# Patient Record
Sex: MALE | Race: WHITE | NOT HISPANIC OR LATINO | URBAN - METROPOLITAN AREA
[De-identification: names, ages, dates, MRNs, and addresses within clinical notes are randomized per-mention and may not be internally consistent; named-entity substitution may affect disease eponyms.]

---

## 2017-02-21 ENCOUNTER — EMERGENCY (EMERGENCY)
Facility: HOSPITAL | Age: 62
LOS: 1 days | Discharge: HOME | End: 2017-02-21
Attending: EMERGENCY MEDICINE | Admitting: EMERGENCY MEDICINE
Payer: COMMERCIAL

## 2017-02-21 VITALS
RESPIRATION RATE: 20 BRPM | OXYGEN SATURATION: 98 % | DIASTOLIC BLOOD PRESSURE: 75 MMHG | TEMPERATURE: 98 F | SYSTOLIC BLOOD PRESSURE: 148 MMHG | HEART RATE: 85 BPM

## 2017-02-21 DIAGNOSIS — K62.89 OTHER SPECIFIED DISEASES OF ANUS AND RECTUM: ICD-10-CM

## 2017-02-21 LAB
ALBUMIN SERPL ELPH-MCNC: 3.8 G/DL — SIGNIFICANT CHANGE UP (ref 3.3–5)
ALP SERPL-CCNC: 106 U/L — SIGNIFICANT CHANGE UP (ref 40–120)
ALT FLD-CCNC: 19 U/L RC — SIGNIFICANT CHANGE UP (ref 10–45)
ANION GAP SERPL CALC-SCNC: 15 MMOL/L — SIGNIFICANT CHANGE UP (ref 5–17)
AST SERPL-CCNC: 16 U/L — SIGNIFICANT CHANGE UP (ref 10–40)
BASOPHILS # BLD AUTO: 0 K/UL — SIGNIFICANT CHANGE UP (ref 0–0.2)
BILIRUB SERPL-MCNC: 0.5 MG/DL — SIGNIFICANT CHANGE UP (ref 0.2–1.2)
BUN SERPL-MCNC: 13 MG/DL — SIGNIFICANT CHANGE UP (ref 7–23)
CALCIUM SERPL-MCNC: 8.6 MG/DL — SIGNIFICANT CHANGE UP (ref 8.4–10.5)
CHLORIDE SERPL-SCNC: 101 MMOL/L — SIGNIFICANT CHANGE UP (ref 96–108)
CO2 SERPL-SCNC: 24 MMOL/L — SIGNIFICANT CHANGE UP (ref 22–31)
CREAT SERPL-MCNC: 0.74 MG/DL — SIGNIFICANT CHANGE UP (ref 0.5–1.3)
EOSINOPHIL # BLD AUTO: 0 K/UL — SIGNIFICANT CHANGE UP (ref 0–0.5)
GLUCOSE SERPL-MCNC: 224 MG/DL — HIGH (ref 70–99)
HCT VFR BLD CALC: 34 % — LOW (ref 39–50)
HGB BLD-MCNC: 10.9 G/DL — LOW (ref 13–17)
LYMPHOCYTES # BLD AUTO: 1.3 K/UL — SIGNIFICANT CHANGE UP (ref 1–3.3)
LYMPHOCYTES # BLD AUTO: 20 % — SIGNIFICANT CHANGE UP (ref 13–44)
MCHC RBC-ENTMCNC: 20.7 PG — LOW (ref 27–34)
MCHC RBC-ENTMCNC: 32.1 GM/DL — SIGNIFICANT CHANGE UP (ref 32–36)
MCV RBC AUTO: 64.5 FL — LOW (ref 80–100)
MONOCYTES # BLD AUTO: 0.5 K/UL — SIGNIFICANT CHANGE UP (ref 0–0.9)
MONOCYTES NFR BLD AUTO: 2 % — SIGNIFICANT CHANGE UP (ref 2–14)
NEUTROPHILS # BLD AUTO: 6.4 K/UL — SIGNIFICANT CHANGE UP (ref 1.8–7.4)
NEUTROPHILS NFR BLD AUTO: 77 % — SIGNIFICANT CHANGE UP (ref 43–77)
PLATELET # BLD AUTO: 326 K/UL — SIGNIFICANT CHANGE UP (ref 150–400)
POTASSIUM SERPL-MCNC: 4.2 MMOL/L — SIGNIFICANT CHANGE UP (ref 3.5–5.3)
POTASSIUM SERPL-SCNC: 4.2 MMOL/L — SIGNIFICANT CHANGE UP (ref 3.5–5.3)
PROT SERPL-MCNC: 7.7 G/DL — SIGNIFICANT CHANGE UP (ref 6–8.3)
RBC # BLD: 5.27 M/UL — SIGNIFICANT CHANGE UP (ref 4.2–5.8)
RBC # FLD: 14.2 % — SIGNIFICANT CHANGE UP (ref 10.3–14.5)
SODIUM SERPL-SCNC: 140 MMOL/L — SIGNIFICANT CHANGE UP (ref 135–145)
WBC # BLD: 8.2 K/UL — SIGNIFICANT CHANGE UP (ref 3.8–10.5)
WBC # FLD AUTO: 8.2 K/UL — SIGNIFICANT CHANGE UP (ref 3.8–10.5)

## 2017-02-21 PROCEDURE — 99284 EMERGENCY DEPT VISIT MOD MDM: CPT

## 2017-02-21 PROCEDURE — 71020: CPT | Mod: 26

## 2017-02-21 RX ORDER — SODIUM CHLORIDE 9 MG/ML
1000 INJECTION INTRAMUSCULAR; INTRAVENOUS; SUBCUTANEOUS ONCE
Qty: 0 | Refills: 0 | Status: COMPLETED | OUTPATIENT
Start: 2017-02-21 | End: 2017-02-21

## 2017-02-21 RX ADMIN — SODIUM CHLORIDE 1000 MILLILITER(S): 9 INJECTION INTRAMUSCULAR; INTRAVENOUS; SUBCUTANEOUS at 22:10

## 2017-02-21 NOTE — ED ADULT NURSE NOTE - OBJECTIVE STATEMENT
61 yr male presented to ED with family c/o rectal pain.  Pt reports difficulty having a bowel movement and urinating for 1 day with night sweats, Pt reports losing 10lbs over the last 3 weeks.  Pt reports having a bowel movement here and now feels better, Pt reports "I no longer have pain."  Pt denies fever/cough, no abdominal pain, bowel sounds present x4, abdomin soft non-distended, non tender to the touch, Pt reports stool was normal color, not red, no n/v, skin warm dry & intact, a&ox4.

## 2017-02-21 NOTE — ED PROVIDER NOTE - ATTENDING CONTRIBUTION TO CARE
62 yo male with PMH of HTN, HL, DM presents with 3 weeks worth of night sweats and weight loss with some chills. No f/n/v/diarrhea/abdominal pain/myalgias/arthralgias/rash/neuro sx. No GI bleeding. No hx of malignancy in himself. Father with prostate cancer- his PSA normal last year. Had some rectal pressure prior to coming here, alleviated with BM. Patient went to PMD with negative quantiferon for TB. On exam, AVS< NAD, cta bl, s1, s,2 rrr, soft nt  nd adbomn, no r/g/r, no le edema, no bruising, petechea, No LAD, including in groin. ambulatory.

## 2017-02-21 NOTE — ED PROVIDER NOTE - OBJECTIVE STATEMENT
60 yo male with PMHx of DM and HLD p/w night sweats.  The patient reports that he has had night sweats for the past 2-3 weeks.  He was seen by his PMD and had a negative cxr, negative quant gold for TB and was started on azithromycin without improvement.  He denies fevers/chills, cough, sob.  He also reports that he has been constipated for the past 2-3 days.  This was associated with rectal pressure.  In the ED he had a large bowel movement and his rectal pressure resolved. BM was normal, denies hematochezia and melena.  Patient also endorses weight loss ~10lbs over the past few weeks.  Patient never had a colonoscopy.

## 2017-02-21 NOTE — ED PROVIDER NOTE - CARE PLAN
Principal Discharge DX:	Constipation  Instructions for follow-up, activity and diet:	1.  Stay Hydrated  2.  Take Miralax daily as needed for constipation  3.  Follow up with your PMD to schedule outpatient colonoscopy   4.  Return to the ER for abdominal pain, lightheadedness, shortness of breath or any other concerning symptoms

## 2017-02-21 NOTE — ED PROVIDER NOTE - PLAN OF CARE
1.  Stay Hydrated  2.  Take Miralax daily as needed for constipation  3.  Follow up with your PMD to schedule outpatient colonoscopy   4.  Return to the ER for abdominal pain, lightheadedness, shortness of breath or any other concerning symptoms

## 2017-02-22 VITALS
SYSTOLIC BLOOD PRESSURE: 141 MMHG | OXYGEN SATURATION: 100 % | RESPIRATION RATE: 20 BRPM | HEART RATE: 80 BPM | DIASTOLIC BLOOD PRESSURE: 75 MMHG

## 2017-02-22 LAB
APPEARANCE UR: CLEAR — SIGNIFICANT CHANGE UP
BILIRUB UR-MCNC: NEGATIVE — SIGNIFICANT CHANGE UP
COLOR SPEC: YELLOW — SIGNIFICANT CHANGE UP
COMMENT - URINE: SIGNIFICANT CHANGE UP
DIFF PNL FLD: NEGATIVE — SIGNIFICANT CHANGE UP
EPI CELLS # UR: SIGNIFICANT CHANGE UP /HPF
GLUCOSE UR QL: 500
KETONES UR-MCNC: ABNORMAL
LEUKOCYTE ESTERASE UR-ACNC: NEGATIVE — SIGNIFICANT CHANGE UP
NITRITE UR-MCNC: NEGATIVE — SIGNIFICANT CHANGE UP
PH UR: 5.5 — SIGNIFICANT CHANGE UP (ref 4.8–8)
PROT UR-MCNC: SIGNIFICANT CHANGE UP
RBC CASTS # UR COMP ASSIST: SIGNIFICANT CHANGE UP /HPF (ref 0–2)
SP GR SPEC: 1.03 — HIGH (ref 1.01–1.02)
UROBILINOGEN FLD QL: NEGATIVE — SIGNIFICANT CHANGE UP
WBC UR QL: SIGNIFICANT CHANGE UP /HPF (ref 0–5)

## 2017-02-22 PROCEDURE — 82272 OCCULT BLD FECES 1-3 TESTS: CPT

## 2017-02-22 PROCEDURE — 85027 COMPLETE CBC AUTOMATED: CPT

## 2017-02-22 PROCEDURE — 71046 X-RAY EXAM CHEST 2 VIEWS: CPT

## 2017-02-22 PROCEDURE — 99284 EMERGENCY DEPT VISIT MOD MDM: CPT | Mod: 25

## 2017-02-22 PROCEDURE — 81001 URINALYSIS AUTO W/SCOPE: CPT

## 2017-02-22 PROCEDURE — 80053 COMPREHEN METABOLIC PANEL: CPT

## 2017-02-22 NOTE — ED ADULT NURSE REASSESSMENT NOTE - NS ED NURSE REASSESS COMMENT FT1
Pt d/c received written & verbal instructions, pt verbalized understanding, IV d/c site clear no redness or swelling, VSS, a&ox4.

## 2020-03-30 ENCOUNTER — INPATIENT (INPATIENT)
Facility: HOSPITAL | Age: 65
LOS: 14 days | Discharge: HOME HEALTH | DRG: 177 | End: 2020-04-14
Attending: PEDIATRICS | Admitting: STUDENT IN AN ORGANIZED HEALTH CARE EDUCATION/TRAINING PROGRAM
Payer: COMMERCIAL

## 2020-03-30 VITALS
DIASTOLIC BLOOD PRESSURE: 54 MMHG | TEMPERATURE: 100 F | RESPIRATION RATE: 22 BRPM | HEART RATE: 91 BPM | OXYGEN SATURATION: 89 % | WEIGHT: 154.32 LBS | SYSTOLIC BLOOD PRESSURE: 79 MMHG | HEIGHT: 68 IN

## 2020-03-30 LAB — GAS PNL BLDV: SIGNIFICANT CHANGE UP

## 2020-03-30 PROCEDURE — 93010 ELECTROCARDIOGRAM REPORT: CPT | Mod: NC

## 2020-03-30 PROCEDURE — 99285 EMERGENCY DEPT VISIT HI MDM: CPT

## 2020-03-30 PROCEDURE — 71045 X-RAY EXAM CHEST 1 VIEW: CPT | Mod: 26

## 2020-03-30 RX ORDER — SODIUM CHLORIDE 9 MG/ML
500 INJECTION INTRAMUSCULAR; INTRAVENOUS; SUBCUTANEOUS ONCE
Refills: 0 | Status: COMPLETED | OUTPATIENT
Start: 2020-03-30 | End: 2020-03-30

## 2020-03-30 RX ADMIN — SODIUM CHLORIDE 500 MILLILITER(S): 9 INJECTION INTRAMUSCULAR; INTRAVENOUS; SUBCUTANEOUS at 22:30

## 2020-03-30 NOTE — ED PROVIDER NOTE - CLINICAL SUMMARY MEDICAL DECISION MAKING FREE TEXT BOX
Adult male with progressive respiratory symptoms cough,fever,fatgiue body aches and hypoxia, cw covid19. Check xr, covid swab, labs, treat w ivf and oxgen. tba

## 2020-03-30 NOTE — ED PROVIDER NOTE - OBJECTIVE STATEMENT
Private Physician can't recall  64y male PMH DM,HLD, pw several day hx of cough,body aches, tactile fever, progressive shortness of breath,without spuutm,has mild pleuretic cp,no nvdc. abd pain. Private Physician can't recall  64y male PMH DM,HLD, pw several day hx of cough,body aches, tactile fever, progressive shortness of breath,without sputum ,has mild pleuritic cp,no nvdc. abd pain.

## 2020-03-30 NOTE — ED ADULT TRIAGE NOTE - CHIEF COMPLAINT QUOTE
fever, dry cough elev bs, nausea chest pain fever, dry cough elev bs, nausea chest pain  (dtrs ph: [phone])

## 2020-03-30 NOTE — ED ADULT NURSE NOTE - OBJECTIVE STATEMENT
63y/o M coming to the ED c/o of SOB/Fever. Pt came from triage hypotensive to 79/58, 89% RA, tachypneic and labored. On arrival, pt placed on 2L NC saturating at 95%, /75, and started on 500CC NS. Pt states that for the past 5-6 days, pt has been experiencing a dry cough and subjective fevers. Pt states that his SOB worsened today. Pt c/o of generalized body aches 9/10. Pt denies any N/V/D but c/o of sternal CP d/t constant cough. On exam, abdomen soft, non-tender, non-distended. Heart monitor placed, NSR. B/L 18 gauges placed. EKG completed. COVID 19 swab sent. Pt educated on airborne, contact, droplet precautions. Pt given call bell and educated on how to use.

## 2020-03-30 NOTE — ED ADULT NURSE NOTE - NSIMPLEMENTINTERV_GEN_ALL_ED
Implemented All Universal Safety Interventions:  [place] to call system. Call bell, personal items and telephone within reach. Instruct patient to call for assistance. Room bathroom lighting operational. Non-slip footwear when patient is off stretcher. Physically safe environment: no spills, clutter or unnecessary equipment. Stretcher in lowest position, wheels locked, appropriate side rails in place.

## 2020-03-31 DIAGNOSIS — J96.90 RESPIRATORY FAILURE, UNSPECIFIED, UNSPECIFIED WHETHER WITH HYPOXIA OR HYPERCAPNIA: ICD-10-CM

## 2020-03-31 DIAGNOSIS — Z29.9 ENCOUNTER FOR PROPHYLACTIC MEASURES, UNSPECIFIED: ICD-10-CM

## 2020-03-31 DIAGNOSIS — J96.01 ACUTE RESPIRATORY FAILURE WITH HYPOXIA: ICD-10-CM

## 2020-03-31 DIAGNOSIS — E11.9 TYPE 2 DIABETES MELLITUS WITHOUT COMPLICATIONS: ICD-10-CM

## 2020-03-31 PROBLEM — E78.00 PURE HYPERCHOLESTEROLEMIA, UNSPECIFIED: Chronic | Status: ACTIVE | Noted: 2017-02-21

## 2020-03-31 LAB
ALBUMIN SERPL ELPH-MCNC: 2.8 G/DL — LOW (ref 3.3–5)
ALBUMIN SERPL ELPH-MCNC: 3.1 G/DL — LOW (ref 3.3–5)
ALP SERPL-CCNC: 116 U/L — SIGNIFICANT CHANGE UP (ref 40–120)
ALP SERPL-CCNC: 130 U/L — HIGH (ref 40–120)
ALT FLD-CCNC: 29 U/L — SIGNIFICANT CHANGE UP (ref 10–45)
ALT FLD-CCNC: 41 U/L — SIGNIFICANT CHANGE UP (ref 10–45)
ANION GAP SERPL CALC-SCNC: 17 MMOL/L — SIGNIFICANT CHANGE UP (ref 5–17)
ANION GAP SERPL CALC-SCNC: 18 MMOL/L — HIGH (ref 5–17)
APTT BLD: 29.8 SEC — SIGNIFICANT CHANGE UP (ref 27.5–36.3)
AST SERPL-CCNC: 47 U/L — HIGH (ref 10–40)
AST SERPL-CCNC: 67 U/L — HIGH (ref 10–40)
BASOPHILS # BLD AUTO: 0 K/UL — SIGNIFICANT CHANGE UP (ref 0–0.2)
BASOPHILS # BLD AUTO: 0 K/UL — SIGNIFICANT CHANGE UP (ref 0–0.2)
BASOPHILS NFR BLD AUTO: 0 % — SIGNIFICANT CHANGE UP (ref 0–2)
BASOPHILS NFR BLD AUTO: 0 % — SIGNIFICANT CHANGE UP (ref 0–2)
BILIRUB SERPL-MCNC: 0.4 MG/DL — SIGNIFICANT CHANGE UP (ref 0.2–1.2)
BILIRUB SERPL-MCNC: 0.5 MG/DL — SIGNIFICANT CHANGE UP (ref 0.2–1.2)
BUN SERPL-MCNC: 10 MG/DL — SIGNIFICANT CHANGE UP (ref 7–23)
BUN SERPL-MCNC: 9 MG/DL — SIGNIFICANT CHANGE UP (ref 7–23)
CALCIUM SERPL-MCNC: 8.1 MG/DL — LOW (ref 8.4–10.5)
CALCIUM SERPL-MCNC: 8.4 MG/DL — SIGNIFICANT CHANGE UP (ref 8.4–10.5)
CHLORIDE SERPL-SCNC: 100 MMOL/L — SIGNIFICANT CHANGE UP (ref 96–108)
CHLORIDE SERPL-SCNC: 99 MMOL/L — SIGNIFICANT CHANGE UP (ref 96–108)
CK SERPL-CCNC: 197 U/L — SIGNIFICANT CHANGE UP (ref 30–200)
CO2 SERPL-SCNC: 21 MMOL/L — LOW (ref 22–31)
CO2 SERPL-SCNC: 22 MMOL/L — SIGNIFICANT CHANGE UP (ref 22–31)
CREAT SERPL-MCNC: 0.69 MG/DL — SIGNIFICANT CHANGE UP (ref 0.5–1.3)
CREAT SERPL-MCNC: 0.76 MG/DL — SIGNIFICANT CHANGE UP (ref 0.5–1.3)
CRP SERPL-MCNC: 24.9 MG/DL — HIGH (ref 0–0.4)
ELLIPTOCYTES BLD QL SMEAR: SLIGHT — SIGNIFICANT CHANGE UP
EOSINOPHIL # BLD AUTO: 0 K/UL — SIGNIFICANT CHANGE UP (ref 0–0.5)
EOSINOPHIL # BLD AUTO: 0 K/UL — SIGNIFICANT CHANGE UP (ref 0–0.5)
EOSINOPHIL NFR BLD AUTO: 0 % — SIGNIFICANT CHANGE UP (ref 0–6)
EOSINOPHIL NFR BLD AUTO: 0 % — SIGNIFICANT CHANGE UP (ref 0–6)
ERYTHROCYTE [SEDIMENTATION RATE] IN BLOOD: 46 MM/HR — HIGH (ref 0–20)
FERRITIN SERPL-MCNC: 883 NG/ML — HIGH (ref 30–400)
GLUCOSE BLDC GLUCOMTR-MCNC: 184 MG/DL — HIGH (ref 70–99)
GLUCOSE BLDC GLUCOMTR-MCNC: 204 MG/DL — HIGH (ref 70–99)
GLUCOSE BLDC GLUCOMTR-MCNC: 229 MG/DL — HIGH (ref 70–99)
GLUCOSE BLDC GLUCOMTR-MCNC: 235 MG/DL — HIGH (ref 70–99)
GLUCOSE SERPL-MCNC: 239 MG/DL — HIGH (ref 70–99)
GLUCOSE SERPL-MCNC: 266 MG/DL — HIGH (ref 70–99)
HCT VFR BLD CALC: 32.9 % — LOW (ref 39–50)
HCT VFR BLD CALC: 35.3 % — LOW (ref 39–50)
HGB BLD-MCNC: 10.7 G/DL — LOW (ref 13–17)
HGB BLD-MCNC: 11.5 G/DL — LOW (ref 13–17)
HYPOCHROMIA BLD QL: SLIGHT — SIGNIFICANT CHANGE UP
INR BLD: 1.03 RATIO — SIGNIFICANT CHANGE UP (ref 0.88–1.16)
LDH SERPL L TO P-CCNC: 507 U/L — HIGH (ref 50–242)
LYMPHOCYTES # BLD AUTO: 0.41 K/UL — LOW (ref 1–3.3)
LYMPHOCYTES # BLD AUTO: 0.89 K/UL — LOW (ref 1–3.3)
LYMPHOCYTES # BLD AUTO: 12 % — LOW (ref 13–44)
LYMPHOCYTES # BLD AUTO: 4.8 % — LOW (ref 13–44)
MAGNESIUM SERPL-MCNC: 2 MG/DL — SIGNIFICANT CHANGE UP (ref 1.6–2.6)
MANUAL SMEAR VERIFICATION: SIGNIFICANT CHANGE UP
MCHC RBC-ENTMCNC: 20.7 PG — LOW (ref 27–34)
MCHC RBC-ENTMCNC: 20.8 PG — LOW (ref 27–34)
MCHC RBC-ENTMCNC: 32.5 GM/DL — SIGNIFICANT CHANGE UP (ref 32–36)
MCHC RBC-ENTMCNC: 32.6 GM/DL — SIGNIFICANT CHANGE UP (ref 32–36)
MCV RBC AUTO: 63.7 FL — LOW (ref 80–100)
MCV RBC AUTO: 63.8 FL — LOW (ref 80–100)
MONOCYTES # BLD AUTO: 0.15 K/UL — SIGNIFICANT CHANGE UP (ref 0–0.9)
MONOCYTES # BLD AUTO: 0.16 K/UL — SIGNIFICANT CHANGE UP (ref 0–0.9)
MONOCYTES NFR BLD AUTO: 1.9 % — LOW (ref 2–14)
MONOCYTES NFR BLD AUTO: 2 % — SIGNIFICANT CHANGE UP (ref 2–14)
MYELOCYTES NFR BLD: 2 % — HIGH (ref 0–0)
NEUTROPHILS # BLD AUTO: 6.2 K/UL — SIGNIFICANT CHANGE UP (ref 1.8–7.4)
NEUTROPHILS # BLD AUTO: 7.5 K/UL — HIGH (ref 1.8–7.4)
NEUTROPHILS NFR BLD AUTO: 82 % — HIGH (ref 43–77)
NEUTROPHILS NFR BLD AUTO: 85.6 % — HIGH (ref 43–77)
NEUTS BAND # BLD: 2 % — SIGNIFICANT CHANGE UP (ref 0–8)
NRBC # BLD: 0 /100 — SIGNIFICANT CHANGE UP (ref 0–0)
PLAT MORPH BLD: NORMAL — SIGNIFICANT CHANGE UP
PLATELET # BLD AUTO: 264 K/UL — SIGNIFICANT CHANGE UP (ref 150–400)
PLATELET # BLD AUTO: 267 K/UL — SIGNIFICANT CHANGE UP (ref 150–400)
POIKILOCYTOSIS BLD QL AUTO: SLIGHT — SIGNIFICANT CHANGE UP
POTASSIUM SERPL-MCNC: 3.7 MMOL/L — SIGNIFICANT CHANGE UP (ref 3.5–5.3)
POTASSIUM SERPL-MCNC: 4.2 MMOL/L — SIGNIFICANT CHANGE UP (ref 3.5–5.3)
POTASSIUM SERPL-SCNC: 3.7 MMOL/L — SIGNIFICANT CHANGE UP (ref 3.5–5.3)
POTASSIUM SERPL-SCNC: 4.2 MMOL/L — SIGNIFICANT CHANGE UP (ref 3.5–5.3)
PROCALCITONIN SERPL-MCNC: 0.28 NG/ML — HIGH (ref 0.02–0.1)
PROT SERPL-MCNC: 6.5 G/DL — SIGNIFICANT CHANGE UP (ref 6–8.3)
PROT SERPL-MCNC: 7.2 G/DL — SIGNIFICANT CHANGE UP (ref 6–8.3)
PROTHROM AB SERPL-ACNC: 11.8 SEC — SIGNIFICANT CHANGE UP (ref 10–12.9)
RBC # BLD: 5.16 M/UL — SIGNIFICANT CHANGE UP (ref 4.2–5.8)
RBC # BLD: 5.54 M/UL — SIGNIFICANT CHANGE UP (ref 4.2–5.8)
RBC # FLD: 15.9 % — HIGH (ref 10.3–14.5)
RBC # FLD: 16.1 % — HIGH (ref 10.3–14.5)
RBC BLD AUTO: ABNORMAL
SODIUM SERPL-SCNC: 138 MMOL/L — SIGNIFICANT CHANGE UP (ref 135–145)
SODIUM SERPL-SCNC: 139 MMOL/L — SIGNIFICANT CHANGE UP (ref 135–145)
WBC # BLD: 7.38 K/UL — SIGNIFICANT CHANGE UP (ref 3.8–10.5)
WBC # BLD: 8.48 K/UL — SIGNIFICANT CHANGE UP (ref 3.8–10.5)
WBC # FLD AUTO: 7.38 K/UL — SIGNIFICANT CHANGE UP (ref 3.8–10.5)
WBC # FLD AUTO: 8.48 K/UL — SIGNIFICANT CHANGE UP (ref 3.8–10.5)

## 2020-03-31 PROCEDURE — 99223 1ST HOSP IP/OBS HIGH 75: CPT

## 2020-03-31 RX ORDER — GLUCAGON INJECTION, SOLUTION 0.5 MG/.1ML
1 INJECTION, SOLUTION SUBCUTANEOUS ONCE
Refills: 0 | Status: DISCONTINUED | OUTPATIENT
Start: 2020-03-31 | End: 2020-04-04

## 2020-03-31 RX ORDER — ACETAMINOPHEN 500 MG
650 TABLET ORAL EVERY 4 HOURS
Refills: 0 | Status: DISCONTINUED | OUTPATIENT
Start: 2020-03-31 | End: 2020-04-14

## 2020-03-31 RX ORDER — ENOXAPARIN SODIUM 100 MG/ML
40 INJECTION SUBCUTANEOUS DAILY
Refills: 0 | Status: DISCONTINUED | OUTPATIENT
Start: 2020-03-31 | End: 2020-04-08

## 2020-03-31 RX ORDER — ASCORBIC ACID 60 MG
1500 TABLET,CHEWABLE ORAL DAILY
Refills: 0 | Status: DISCONTINUED | OUTPATIENT
Start: 2020-03-31 | End: 2020-04-14

## 2020-03-31 RX ORDER — DEXTROSE 50 % IN WATER 50 %
25 SYRINGE (ML) INTRAVENOUS ONCE
Refills: 0 | Status: DISCONTINUED | OUTPATIENT
Start: 2020-03-31 | End: 2020-04-05

## 2020-03-31 RX ORDER — INSULIN LISPRO 100/ML
VIAL (ML) SUBCUTANEOUS
Refills: 0 | Status: DISCONTINUED | OUTPATIENT
Start: 2020-03-31 | End: 2020-04-05

## 2020-03-31 RX ORDER — DEXTROSE 50 % IN WATER 50 %
15 SYRINGE (ML) INTRAVENOUS ONCE
Refills: 0 | Status: DISCONTINUED | OUTPATIENT
Start: 2020-03-31 | End: 2020-04-05

## 2020-03-31 RX ORDER — DEXTROSE 50 % IN WATER 50 %
12.5 SYRINGE (ML) INTRAVENOUS ONCE
Refills: 0 | Status: DISCONTINUED | OUTPATIENT
Start: 2020-03-31 | End: 2020-04-05

## 2020-03-31 RX ORDER — ZINC SULFATE TAB 220 MG (50 MG ZINC EQUIVALENT) 220 (50 ZN) MG
220 TAB ORAL DAILY
Refills: 0 | Status: DISCONTINUED | OUTPATIENT
Start: 2020-03-31 | End: 2020-04-14

## 2020-03-31 RX ORDER — SODIUM CHLORIDE 9 MG/ML
1000 INJECTION, SOLUTION INTRAVENOUS
Refills: 0 | Status: DISCONTINUED | OUTPATIENT
Start: 2020-03-31 | End: 2020-04-05

## 2020-03-31 RX ORDER — INSULIN LISPRO 100/ML
VIAL (ML) SUBCUTANEOUS AT BEDTIME
Refills: 0 | Status: DISCONTINUED | OUTPATIENT
Start: 2020-03-31 | End: 2020-04-03

## 2020-03-31 RX ORDER — ACETAMINOPHEN 500 MG
650 TABLET ORAL EVERY 4 HOURS
Refills: 0 | Status: DISCONTINUED | OUTPATIENT
Start: 2020-03-31 | End: 2020-04-04

## 2020-03-31 RX ADMIN — ENOXAPARIN SODIUM 40 MILLIGRAM(S): 100 INJECTION SUBCUTANEOUS at 11:46

## 2020-03-31 RX ADMIN — Medication 1500 MILLIGRAM(S): at 15:26

## 2020-03-31 RX ADMIN — Medication 4: at 11:45

## 2020-03-31 RX ADMIN — ZINC SULFATE TAB 220 MG (50 MG ZINC EQUIVALENT) 220 MILLIGRAM(S): 220 (50 ZN) TAB at 15:26

## 2020-03-31 RX ADMIN — Medication 4: at 17:17

## 2020-03-31 NOTE — ED ADULT NURSE REASSESSMENT NOTE - NS ED NURSE REASSESS COMMENT FT1
Report received from Payam Cox in green. Pt observed sitting up in stretcher conversing with RN without difficulty. Pt maintained on 5L NC with oxygen saturation >95%. Pt is well appearing, speaking full sentences without difficulty with intermittent coughing. Pt repositioned for comfort and updated on plan of care awaiting bed assignment at this time. Safety and comfort measures initiated- bed placed in lowest position and side rails raised. Pt oriented to call bell system.

## 2020-03-31 NOTE — H&P ADULT - ASSESSMENT
63yo male with hx of DM II, presented to the ED with complaints of cough, myalgias, fever and progressively worsening sob for the past several days, noted to have acute respiratory failure with hypoxia likely due to viral Pneumonia rule out COVID

## 2020-03-31 NOTE — H&P ADULT - PROBLEM SELECTOR PLAN 2
-CXR with Bilateral patchy opacities concerning for COVID-19  -Likely due to exposure at work  -Maintain isolation  -Management as above regarding respiratory support  -Follow up with COVID testing. If positive, start Plaquenil and monitor EKG/Mg/K levels

## 2020-03-31 NOTE — H&P ADULT - PROBLEM SELECTOR PLAN 1
-Likely secondary to Viral PNA, Rule out COVID  -CXR with Bilateral patchy opacities  -Provide supplemental O2. Escalate to NRB if required   -Wean off O2 as tolerated  -Monitor vitals

## 2020-03-31 NOTE — H&P ADULT - HISTORY OF PRESENT ILLNESS
65yo male 63yo male with hx of DM II, presented to the ED with complaints of cough, myalgias, fever and progressively worsening sob for the past several days. Pt states his symptoms started approximately 4-5 days ago. He initially complained of a mild cough which worsened with sob. He started developing fever as well. 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 He states he stopped going to work 1 week ago, a few days before developing symptoms. No travel history either.  Pt denies symptoms of cp, palpitations, abd pain, N/V. He currently has complaints of sob with movement and diffuse body aches.     In the ED pt noted to have respiratory compromise, requiring supplemental O2 with NC.

## 2020-04-01 LAB
GLUCOSE BLDC GLUCOMTR-MCNC: 206 MG/DL — HIGH (ref 70–99)
GLUCOSE BLDC GLUCOMTR-MCNC: 211 MG/DL — HIGH (ref 70–99)
GLUCOSE BLDC GLUCOMTR-MCNC: 221 MG/DL — HIGH (ref 70–99)
GLUCOSE BLDC GLUCOMTR-MCNC: 249 MG/DL — HIGH (ref 70–99)
SARS-COV-2 RNA SPEC QL NAA+PROBE: DETECTED

## 2020-04-01 PROCEDURE — 99233 SBSQ HOSP IP/OBS HIGH 50: CPT | Mod: GC

## 2020-04-01 RX ORDER — HYDROXYCHLOROQUINE SULFATE 200 MG
400 TABLET ORAL EVERY 12 HOURS
Refills: 0 | Status: COMPLETED | OUTPATIENT
Start: 2020-04-01 | End: 2020-04-02

## 2020-04-01 RX ORDER — SODIUM CHLORIDE 9 MG/ML
500 INJECTION INTRAMUSCULAR; INTRAVENOUS; SUBCUTANEOUS ONCE
Refills: 0 | Status: COMPLETED | OUTPATIENT
Start: 2020-04-01 | End: 2020-04-01

## 2020-04-01 RX ORDER — HYDROXYCHLOROQUINE SULFATE 200 MG
TABLET ORAL
Refills: 0 | Status: DISCONTINUED | OUTPATIENT
Start: 2020-04-01 | End: 2020-04-03

## 2020-04-01 RX ORDER — HYDROXYCHLOROQUINE SULFATE 200 MG
200 TABLET ORAL EVERY 12 HOURS
Refills: 0 | Status: DISCONTINUED | OUTPATIENT
Start: 2020-04-02 | End: 2020-04-03

## 2020-04-01 RX ORDER — INFLUENZA VIRUS VACCINE 15; 15; 15; 15 UG/.5ML; UG/.5ML; UG/.5ML; UG/.5ML
0.5 SUSPENSION INTRAMUSCULAR ONCE
Refills: 0 | Status: DISCONTINUED | OUTPATIENT
Start: 2020-04-01 | End: 2020-04-14

## 2020-04-01 RX ORDER — GUAIFENESIN/DEXTROMETHORPHAN 600MG-30MG
10 TABLET, EXTENDED RELEASE 12 HR ORAL EVERY 6 HOURS
Refills: 0 | Status: DISCONTINUED | OUTPATIENT
Start: 2020-04-01 | End: 2020-04-14

## 2020-04-01 RX ADMIN — Medication 400 MILLIGRAM(S): at 13:40

## 2020-04-01 RX ADMIN — Medication 1500 MILLIGRAM(S): at 11:33

## 2020-04-01 RX ADMIN — Medication 10 MILLILITER(S): at 21:54

## 2020-04-01 RX ADMIN — Medication 4: at 18:14

## 2020-04-01 RX ADMIN — Medication 4: at 13:02

## 2020-04-01 RX ADMIN — Medication 4: at 09:26

## 2020-04-01 RX ADMIN — SODIUM CHLORIDE 1000 MILLILITER(S): 9 INJECTION INTRAMUSCULAR; INTRAVENOUS; SUBCUTANEOUS at 21:11

## 2020-04-01 RX ADMIN — ENOXAPARIN SODIUM 40 MILLIGRAM(S): 100 INJECTION SUBCUTANEOUS at 11:33

## 2020-04-01 RX ADMIN — ZINC SULFATE TAB 220 MG (50 MG ZINC EQUIVALENT) 220 MILLIGRAM(S): 220 (50 ZN) TAB at 11:33

## 2020-04-01 RX ADMIN — Medication 100 MILLIGRAM(S): at 10:24

## 2020-04-01 NOTE — PROGRESS NOTE ADULT - PROBLEM SELECTOR PLAN 1
- Likely secondary to Viral PNA, Rule out COVID  - CXR with Bilateral Patchy Opacities  - Provide supplemental O2, Escalate to NRB if required   - Wean off O2 as tolerated  - Monitor vitals

## 2020-04-01 NOTE — PROGRESS NOTE ADULT - SUBJECTIVE AND OBJECTIVE BOX
Patient is a 64y old  Male who presents with a chief complaint of SOB (31 Mar 2020 09:20)      SUBJECTIVE / OVERNIGHT EVENTS:     MEDICATIONS  (STANDING):  ascorbic acid 1500 milliGRAM(s) Oral daily  dextrose 5%. 1000 milliLiter(s) (50 mL/Hr) IV Continuous <Continuous>  dextrose 50% Injectable 12.5 Gram(s) IV Push once  dextrose 50% Injectable 25 Gram(s) IV Push once  dextrose 50% Injectable 25 Gram(s) IV Push once  enoxaparin Injectable 40 milliGRAM(s) SubCutaneous daily  influenza   Vaccine 0.5 milliLiter(s) IntraMuscular once  insulin lispro (HumaLOG) corrective regimen sliding scale   SubCutaneous three times a day before meals  insulin lispro (HumaLOG) corrective regimen sliding scale   SubCutaneous at bedtime  zinc sulfate 220 milliGRAM(s) Oral daily    MEDICATIONS  (PRN):  acetaminophen   Tablet .. 650 milliGRAM(s) Oral every 4 hours PRN Temp greater or equal to 38.5C (101.3F)  acetaminophen  Suppository .. 650 milliGRAM(s) Rectal every 4 hours PRN Temp greater or equal to 38.5C (101.3F)  dextrose 40% Gel 15 Gram(s) Oral once PRN Blood Glucose LESS THAN 70 milliGRAM(s)/deciliter  glucagon  Injectable 1 milliGRAM(s) IntraMuscular once PRN Glucose LESS THAN 70 milligrams/deciliter      Vital Signs Last 24 Hrs  T(C): 37.1 (01 Apr 2020 04:51), Max: 37.6 (31 Mar 2020 14:38)  T(F): 98.7 (01 Apr 2020 04:51), Max: 99.6 (31 Mar 2020 14:38)  HR: 77 (01 Apr 2020 04:51) (75 - 79)  BP: 127/78 (01 Apr 2020 04:51) (94/64 - 129/78)  BP(mean): --  RR: 18 (01 Apr 2020 04:51) (18 - 24)  SpO2: 95% (01 Apr 2020 04:51) (91% - 97%)  CAPILLARY BLOOD GLUCOSE      POCT Blood Glucose.: 184 mg/dL (31 Mar 2020 21:55)  POCT Blood Glucose.: 204 mg/dL (31 Mar 2020 17:13)  POCT Blood Glucose.: 235 mg/dL (31 Mar 2020 11:42)  POCT Blood Glucose.: 229 mg/dL (31 Mar 2020 10:45)    I&O's Summary      PHYSICAL EXAM:  GENERAL: NAD, well-developed  HEAD:  Atraumatic, Normocephalic  EYES: EOMI, PERRLA, conjunctiva and sclera clear  NECK: Supple, No JVD  CHEST/LUNG: Clear to auscultation bilaterally; No wheeze  HEART: Regular rate and rhythm; No murmurs, rubs, or gallops  ABDOMEN: Soft, Nontender, Nondistended; Bowel sounds present  EXTREMITIES:  2+ Peripheral Pulses, No clubbing, cyanosis, or edema  PSYCH: AAOx3  NEUROLOGY: non-focal  SKIN: No rashes or lesions    LABS:                        10.7   7.38  )-----------( 264      ( 31 Mar 2020 11:19 )             32.9     03-31    139  |  100  |  9   ----------------------------<  239<H>  3.7   |  21<L>  |  0.69    Ca    8.1<L>      31 Mar 2020 11:19  Mg     2.0     03-31    TPro  6.5  /  Alb  2.8<L>  /  TBili  0.5  /  DBili  x   /  AST  67<H>  /  ALT  41  /  AlkPhos  116  03-31    PT/INR - ( 30 Mar 2020 23:29 )   PT: 11.8 sec;   INR: 1.03 ratio         PTT - ( 30 Mar 2020 23:29 )  PTT:29.8 sec  CARDIAC MARKERS ( 30 Mar 2020 23:29 )  x     / x     / 197 U/L / x     / x                  RADIOLOGY & ADDITIONAL TESTS:    < from: Xray Chest 1 View-PORTABLE IMMEDIATE (03.30.20 @ 23:56) >  IMPRESSION:     Bilateral patchy opacities are compatible with multifocal pneumonia--viral pneumonia (including COVID19) is in the differential diagnosis.    < end of copied text > Patient is a 64y old  Male who presents with a chief complaint of SOB (31 Mar 2020 09:20)      SUBJECTIVE / OVERNIGHT EVENTS:   No acute complaints.    12 pt ros neg except for above.     MEDICATIONS  (STANDING):  ascorbic acid 1500 milliGRAM(s) Oral daily  dextrose 5%. 1000 milliLiter(s) (50 mL/Hr) IV Continuous <Continuous>  dextrose 50% Injectable 12.5 Gram(s) IV Push once  dextrose 50% Injectable 25 Gram(s) IV Push once  dextrose 50% Injectable 25 Gram(s) IV Push once  enoxaparin Injectable 40 milliGRAM(s) SubCutaneous daily  influenza   Vaccine 0.5 milliLiter(s) IntraMuscular once  insulin lispro (HumaLOG) corrective regimen sliding scale   SubCutaneous three times a day before meals  insulin lispro (HumaLOG) corrective regimen sliding scale   SubCutaneous at bedtime  zinc sulfate 220 milliGRAM(s) Oral daily    MEDICATIONS  (PRN):  acetaminophen   Tablet .. 650 milliGRAM(s) Oral every 4 hours PRN Temp greater or equal to 38.5C (101.3F)  acetaminophen  Suppository .. 650 milliGRAM(s) Rectal every 4 hours PRN Temp greater or equal to 38.5C (101.3F)  dextrose 40% Gel 15 Gram(s) Oral once PRN Blood Glucose LESS THAN 70 milliGRAM(s)/deciliter  glucagon  Injectable 1 milliGRAM(s) IntraMuscular once PRN Glucose LESS THAN 70 milligrams/deciliter      Vital Signs Last 24 Hrs  T(C): 37.1 (01 Apr 2020 04:51), Max: 37.6 (31 Mar 2020 14:38)  T(F): 98.7 (01 Apr 2020 04:51), Max: 99.6 (31 Mar 2020 14:38)  HR: 77 (01 Apr 2020 04:51) (75 - 79)  BP: 127/78 (01 Apr 2020 04:51) (94/64 - 129/78)  BP(mean): --  RR: 18 (01 Apr 2020 04:51) (18 - 24)  SpO2: 95% (01 Apr 2020 04:51) (91% - 97%)  CAPILLARY BLOOD GLUCOSE      POCT Blood Glucose.: 184 mg/dL (31 Mar 2020 21:55)  POCT Blood Glucose.: 204 mg/dL (31 Mar 2020 17:13)  POCT Blood Glucose.: 235 mg/dL (31 Mar 2020 11:42)  POCT Blood Glucose.: 229 mg/dL (31 Mar 2020 10:45)    I&O's Summary      PHYSICAL EXAM:  GENERAL: NAD, well-developed  HEAD:  Atraumatic, Normocephalic  EYES: EOMI, PERRLA, conjunctiva and sclera clear  NECK: Supple, No JVD  CHEST/LUNG: Clear to auscultation bilaterally; No wheeze  HEART: Regular rate and rhythm; No murmurs, rubs, or gallops  ABDOMEN: Soft, Nontender, Nondistended; Bowel sounds present  EXTREMITIES:  2+ Peripheral Pulses, No clubbing, cyanosis, or edema  PSYCH: AAOx3  NEUROLOGY: non-focal  SKIN: No rashes or lesions    LABS:                        10.7   7.38  )-----------( 264      ( 31 Mar 2020 11:19 )             32.9     03-31    139  |  100  |  9   ----------------------------<  239<H>  3.7   |  21<L>  |  0.69    Ca    8.1<L>      31 Mar 2020 11:19  Mg     2.0     03-31    TPro  6.5  /  Alb  2.8<L>  /  TBili  0.5  /  DBili  x   /  AST  67<H>  /  ALT  41  /  AlkPhos  116  03-31    PT/INR - ( 30 Mar 2020 23:29 )   PT: 11.8 sec;   INR: 1.03 ratio         PTT - ( 30 Mar 2020 23:29 )  PTT:29.8 sec  CARDIAC MARKERS ( 30 Mar 2020 23:29 )  x     / x     / 197 U/L / x     / x                  RADIOLOGY & ADDITIONAL TESTS:    < from: Xray Chest 1 View-PORTABLE IMMEDIATE (03.30.20 @ 23:56) >  IMPRESSION:     Bilateral patchy opacities are compatible with multifocal pneumonia--viral pneumonia (including COVID19) is in the differential diagnosis.    < end of copied text >

## 2020-04-01 NOTE — PROGRESS NOTE ADULT - ATTENDING COMMENTS
Patient seen and examined today. I agree with the above findings, assessment, and plan with the following additions and exceptions:     On Plaquenil for COVID-19  Optimize K and Mg.   On 6L NC.  Encouraging awake proning and lateral decubitus positioning as tolerated.   Incentive spirometer and OOBTC as tolerated.  Rest of plan as above    Dr. Tremaine Williamson, DO  Attending Physician  Division of Hospital Medicine  [facility]  Pager:  [phone]

## 2020-04-01 NOTE — PROGRESS NOTE ADULT - PROBLEM SELECTOR PLAN 2
- CXR with Bilateral patchy opacities concerning for COVID-19  - COVID PCR pending, if negative will repeat; if positive will obtain EKG and   - Management as above regarding respiratory support  - Follow up with COVID testing. If positive, start Plaquenil and monitor EKG/Mg/K levels - CXR with Bilateral patchy opacities concerning for COVID-19  - Management as above regarding respiratory support  - On Plaquenil for COVID-19

## 2020-04-02 DIAGNOSIS — J12.89 OTHER VIRAL PNEUMONIA: ICD-10-CM

## 2020-04-02 DIAGNOSIS — I95.89 OTHER HYPOTENSION: ICD-10-CM

## 2020-04-02 LAB
ALBUMIN SERPL ELPH-MCNC: 2.5 G/DL — LOW (ref 3.3–5)
ALP SERPL-CCNC: 191 U/L — HIGH (ref 40–120)
ALT FLD-CCNC: 107 U/L — HIGH (ref 10–45)
ANION GAP SERPL CALC-SCNC: 17 MMOL/L — SIGNIFICANT CHANGE UP (ref 5–17)
AST SERPL-CCNC: 93 U/L — HIGH (ref 10–40)
BASOPHILS # BLD AUTO: 0.01 K/UL — SIGNIFICANT CHANGE UP (ref 0–0.2)
BASOPHILS NFR BLD AUTO: 0.1 % — SIGNIFICANT CHANGE UP (ref 0–2)
BILIRUB SERPL-MCNC: 1 MG/DL — SIGNIFICANT CHANGE UP (ref 0.2–1.2)
BUN SERPL-MCNC: 11 MG/DL — SIGNIFICANT CHANGE UP (ref 7–23)
CALCIUM SERPL-MCNC: 7.8 MG/DL — LOW (ref 8.4–10.5)
CHLORIDE SERPL-SCNC: 99 MMOL/L — SIGNIFICANT CHANGE UP (ref 96–108)
CO2 SERPL-SCNC: 22 MMOL/L — SIGNIFICANT CHANGE UP (ref 22–31)
CREAT SERPL-MCNC: 0.68 MG/DL — SIGNIFICANT CHANGE UP (ref 0.5–1.3)
CRP SERPL-MCNC: 15.61 MG/DL — HIGH (ref 0–0.4)
EOSINOPHIL # BLD AUTO: 0.02 K/UL — SIGNIFICANT CHANGE UP (ref 0–0.5)
EOSINOPHIL NFR BLD AUTO: 0.3 % — SIGNIFICANT CHANGE UP (ref 0–6)
FERRITIN SERPL-MCNC: 924 NG/ML — HIGH (ref 30–400)
FIBRINOGEN AG PPP IA-MCNC: 783 MG/DL — HIGH
GLUCOSE BLDC GLUCOMTR-MCNC: 181 MG/DL — HIGH (ref 70–99)
GLUCOSE BLDC GLUCOMTR-MCNC: 214 MG/DL — HIGH (ref 70–99)
GLUCOSE BLDC GLUCOMTR-MCNC: 237 MG/DL — HIGH (ref 70–99)
GLUCOSE BLDC GLUCOMTR-MCNC: 273 MG/DL — HIGH (ref 70–99)
GLUCOSE BLDC GLUCOMTR-MCNC: 292 MG/DL — HIGH (ref 70–99)
GLUCOSE SERPL-MCNC: 251 MG/DL — HIGH (ref 70–99)
HBA1C BLD-MCNC: 11.6 % — HIGH (ref 4–5.6)
HCT VFR BLD CALC: 34.3 % — LOW (ref 39–50)
HGB BLD-MCNC: 10.9 G/DL — LOW (ref 13–17)
IMM GRANULOCYTES NFR BLD AUTO: 4.7 % — HIGH (ref 0–1.5)
IRON SATN MFR SERPL: 13 % — LOW (ref 16–55)
IRON SATN MFR SERPL: 22 UG/DL — LOW (ref 45–165)
LYMPHOCYTES # BLD AUTO: 1.19 K/UL — SIGNIFICANT CHANGE UP (ref 1–3.3)
LYMPHOCYTES # BLD AUTO: 15.5 % — SIGNIFICANT CHANGE UP (ref 13–44)
MAGNESIUM SERPL-MCNC: 2.1 MG/DL — SIGNIFICANT CHANGE UP (ref 1.6–2.6)
MCHC RBC-ENTMCNC: 20.1 PG — LOW (ref 27–34)
MCHC RBC-ENTMCNC: 31.8 GM/DL — LOW (ref 32–36)
MCV RBC AUTO: 63.2 FL — LOW (ref 80–100)
MONOCYTES # BLD AUTO: 0.41 K/UL — SIGNIFICANT CHANGE UP (ref 0–0.9)
MONOCYTES NFR BLD AUTO: 5.3 % — SIGNIFICANT CHANGE UP (ref 2–14)
NEUTROPHILS # BLD AUTO: 5.69 K/UL — SIGNIFICANT CHANGE UP (ref 1.8–7.4)
NEUTROPHILS NFR BLD AUTO: 74.1 % — SIGNIFICANT CHANGE UP (ref 43–77)
NRBC # BLD: 0 /100 WBCS — SIGNIFICANT CHANGE UP (ref 0–0)
PHOSPHATE SERPL-MCNC: 2.3 MG/DL — LOW (ref 2.5–4.5)
PLATELET # BLD AUTO: 336 K/UL — SIGNIFICANT CHANGE UP (ref 150–400)
POTASSIUM SERPL-MCNC: 3.4 MMOL/L — LOW (ref 3.5–5.3)
POTASSIUM SERPL-SCNC: 3.4 MMOL/L — LOW (ref 3.5–5.3)
PROT SERPL-MCNC: 6.3 G/DL — SIGNIFICANT CHANGE UP (ref 6–8.3)
RBC # BLD: 5.43 M/UL — SIGNIFICANT CHANGE UP (ref 4.2–5.8)
RBC # BLD: 5.43 M/UL — SIGNIFICANT CHANGE UP (ref 4.2–5.8)
RBC # FLD: 15.2 % — HIGH (ref 10.3–14.5)
RETICS #: 19.9 K/UL — LOW (ref 25–125)
RETICS/RBC NFR: 0.4 % — LOW (ref 0.5–2.5)
SODIUM SERPL-SCNC: 138 MMOL/L — SIGNIFICANT CHANGE UP (ref 135–145)
TIBC SERPL-MCNC: 175 UG/DL — LOW (ref 220–430)
UIBC SERPL-MCNC: 153 UG/DL — SIGNIFICANT CHANGE UP (ref 110–370)
WBC # BLD: 7.68 K/UL — SIGNIFICANT CHANGE UP (ref 3.8–10.5)
WBC # FLD AUTO: 7.68 K/UL — SIGNIFICANT CHANGE UP (ref 3.8–10.5)

## 2020-04-02 PROCEDURE — 99233 SBSQ HOSP IP/OBS HIGH 50: CPT | Mod: GC

## 2020-04-02 RX ORDER — SODIUM,POTASSIUM PHOSPHATES 278-250MG
1 POWDER IN PACKET (EA) ORAL
Refills: 0 | Status: DISCONTINUED | OUTPATIENT
Start: 2020-04-02 | End: 2020-04-02

## 2020-04-02 RX ORDER — POTASSIUM PHOSPHATE, MONOBASIC POTASSIUM PHOSPHATE, DIBASIC 236; 224 MG/ML; MG/ML
15 INJECTION, SOLUTION INTRAVENOUS ONCE
Refills: 0 | Status: COMPLETED | OUTPATIENT
Start: 2020-04-02 | End: 2020-04-02

## 2020-04-02 RX ORDER — POTASSIUM CHLORIDE 20 MEQ
40 PACKET (EA) ORAL ONCE
Refills: 0 | Status: DISCONTINUED | OUTPATIENT
Start: 2020-04-02 | End: 2020-04-02

## 2020-04-02 RX ORDER — POTASSIUM CHLORIDE 20 MEQ
20 PACKET (EA) ORAL ONCE
Refills: 0 | Status: COMPLETED | OUTPATIENT
Start: 2020-04-02 | End: 2020-04-02

## 2020-04-02 RX ORDER — INSULIN GLARGINE 100 [IU]/ML
5 INJECTION, SOLUTION SUBCUTANEOUS
Refills: 0 | Status: DISCONTINUED | OUTPATIENT
Start: 2020-04-02 | End: 2020-04-03

## 2020-04-02 RX ADMIN — INSULIN GLARGINE 5 UNIT(S): 100 INJECTION, SOLUTION SUBCUTANEOUS at 17:55

## 2020-04-02 RX ADMIN — ENOXAPARIN SODIUM 40 MILLIGRAM(S): 100 INJECTION SUBCUTANEOUS at 12:57

## 2020-04-02 RX ADMIN — Medication 20 MILLIEQUIVALENT(S): at 09:50

## 2020-04-02 RX ADMIN — Medication 2: at 17:55

## 2020-04-02 RX ADMIN — POTASSIUM PHOSPHATE, MONOBASIC POTASSIUM PHOSPHATE, DIBASIC 62.5 MILLIMOLE(S): 236; 224 INJECTION, SOLUTION INTRAVENOUS at 09:50

## 2020-04-02 RX ADMIN — Medication 10 MILLILITER(S): at 12:57

## 2020-04-02 RX ADMIN — ZINC SULFATE TAB 220 MG (50 MG ZINC EQUIVALENT) 220 MILLIGRAM(S): 220 (50 ZN) TAB at 12:57

## 2020-04-02 RX ADMIN — Medication 10 MILLILITER(S): at 06:50

## 2020-04-02 RX ADMIN — Medication 200 MILLIGRAM(S): at 12:57

## 2020-04-02 RX ADMIN — Medication 400 MILLIGRAM(S): at 00:58

## 2020-04-02 RX ADMIN — Medication 6: at 09:11

## 2020-04-02 RX ADMIN — Medication 4: at 12:56

## 2020-04-02 RX ADMIN — Medication 1500 MILLIGRAM(S): at 12:57

## 2020-04-02 NOTE — DISCHARGE NOTE PROVIDER - NSDCMRMEDTOKEN_GEN_ALL_CORE_FT
metFORMIN:  orally alcohol swabs : Apply topically to affected area 4 times a day   Basaglar KwikPen 100 units/mL subcutaneous solution: 16 unit(s) subcutaneous once a day (at bedtime)   Insulin Pen Needles, 4mm: 1 application subcutaneously 4 times a day. ** Use with insulin pen **   lancets: 1 application subcutaneously 4 times a day   metFORMIN 1000 mg oral tablet: 1 tab(s) orally 2 times a day

## 2020-04-02 NOTE — DISCHARGE NOTE PROVIDER - NSDCCPCAREPLAN_GEN_ALL_CORE_FT
PRINCIPAL DISCHARGE DIAGNOSIS  Diagnosis: Acute respiratory failure with hypoxia  Assessment and Plan of Treatment: You were found to have low oxygen levels when you were admitted to the hospital. This was likely due to the new coronavirus outbreak, which you tested positive for. Your initial oxygen requirement was 10L of the nonrebreather. Throughout the hospital course your oxygen requirements slowly decreased. Upon discharge your oxygen level was appropriate on just room air. If you have increased trouble breathing, please call 911 and come to the emergency room. Please take all your medications as directed and/or prescribed.      SECONDARY DISCHARGE DIAGNOSES  Diagnosis: Disease due to 2019 novel coronavirus  Assessment and Plan of Treatment: You have tested POSITIVE for the novel coronavirus (COVID-19). Upon discharge, you must self-quarantine for 7 days and until you are 3 days symptom-free or until the Department of Health contacts you. Please wear a face mask if you are around other individuals. Try to avoid contact with house members, family, and friends for the duration of this quarantine. Please follow up with your primary care physician within 2-3 weeks of your discharge from the hospital. You were treated with a short course of hydroxychloroquine and the full treatment with a medication called anakinra, which is supposed to reduce your inflammatory response from the coronavirus. Please take all medications as prescribed. If you experience any worsening or recurrence of your symptoms, particularly worsening or high fever, shortness of breathe, extreme fatigue, or bloody cough please call 9-1-1 immediately or report to the nearest Emergency Department. PRINCIPAL DISCHARGE DIAGNOSIS  Diagnosis: Acute respiratory failure with hypoxia  Assessment and Plan of Treatment: You were found to have low oxygen levels when you were admitted to the hospital. This was likely due to the new coronavirus outbreak, which you tested positive for. Your initial oxygen requirement was 10L of the nonrebreather. Throughout the hospital course your oxygen requirements slowly decreased. Upon discharge your oxygen level was appropriate on just room air. If you have increased trouble breathing, please call 911 and come to the emergency room. Please take all your medications as directed and/or prescribed.      SECONDARY DISCHARGE DIAGNOSES  Diagnosis: Diabetes mellitus  Assessment and Plan of Treatment: You were found to have diabetes during this admission. We controlled your sugars with a medication called insulin. You will be discharged on insulin and will require close follow-up with an endocrinologist after discharge. Please make sure you take your medications as directed.    Diagnosis: Disease due to 2019 novel coronavirus  Assessment and Plan of Treatment: You have tested POSITIVE for the novel coronavirus (COVID-19). Upon discharge, you must self-quarantine for 7 days and until you are 3 days symptom-free or until the Department of Health contacts you. Please wear a face mask if you are around other individuals. Try to avoid contact with house members, family, and friends for the duration of this quarantine. Please follow up with your primary care physician within 2-3 weeks of your discharge from the hospital. You were treated with a short course of hydroxychloroquine and the full treatment with a medication called anakinra, which is supposed to reduce your inflammatory response from the coronavirus. Please take all medications as prescribed. If you experience any worsening or recurrence of your symptoms, particularly worsening or high fever, shortness of breathe, extreme fatigue, or bloody cough please call 9-1-1 immediately or report to the nearest Emergency Department.

## 2020-04-02 NOTE — PROGRESS NOTE ADULT - PROBLEM SELECTOR PLAN 3
- s/p given 500cc bolus for low BP, patient not any BP meds at the moment  - patient now normotensive, will continue to monitor - s/p given 500cc bolus for low BP, patient not any BP meds at the moment  - patient now normotensive, will continue to monitor  - Likely from dehydration. Encourage PO intake.

## 2020-04-02 NOTE — PROGRESS NOTE ADULT - PROBLEM SELECTOR PLAN 2
- CXR with Bilateral patchy opacities concerning for COVID-19  - Management as above regarding respiratory support  - On Plaquenil for 5 day course regimen - COVID POSITIVE  - CXR with Bilateral patchy opacities.  - Management as above regarding respiratory support  - On Plaquenil for 5 day course regimen

## 2020-04-02 NOTE — DISCHARGE NOTE PROVIDER - CARE PROVIDER_API CALL
Clarice Mosqueda)  Internal Medicine  [address], [address]  [place] [address]  Phone: [phone]  Fax: [phone]  Established Patient  Follow Up Time: 1 week

## 2020-04-02 NOTE — DISCHARGE NOTE PROVIDER - NSFOLLOWUPCLINICS_GEN_ALL_ED_FT
[facility] End[facility]  Endocrinology  [address] [place] [address]  Phone: [phone]  Fax:   Follow Up Time: 1 week

## 2020-04-02 NOTE — PROGRESS NOTE ADULT - SUBJECTIVE AND OBJECTIVE BOX
Patient is a 64y old  Male who presents with a chief complaint of SOB (02 Apr 2020 14:26)      SUBJECTIVE / OVERNIGHT EVENTS: Patient with     MEDICATIONS  (STANDING):  ascorbic acid 1500 milliGRAM(s) Oral daily  dextrose 5%. 1000 milliLiter(s) (50 mL/Hr) IV Continuous <Continuous>  dextrose 50% Injectable 12.5 Gram(s) IV Push once  dextrose 50% Injectable 25 Gram(s) IV Push once  dextrose 50% Injectable 25 Gram(s) IV Push once  enoxaparin Injectable 40 milliGRAM(s) SubCutaneous daily  hydroxychloroquine   Oral   hydroxychloroquine 200 milliGRAM(s) Oral every 12 hours  influenza   Vaccine 0.5 milliLiter(s) IntraMuscular once  insulin glargine Injectable (LANTUS) 5 Unit(s) SubCutaneous <User Schedule>  insulin lispro (HumaLOG) corrective regimen sliding scale   SubCutaneous three times a day before meals  insulin lispro (HumaLOG) corrective regimen sliding scale   SubCutaneous at bedtime  zinc sulfate 220 milliGRAM(s) Oral daily    MEDICATIONS  (PRN):  acetaminophen   Tablet .. 650 milliGRAM(s) Oral every 4 hours PRN Temp greater or equal to 38.5C (101.3F)  acetaminophen  Suppository .. 650 milliGRAM(s) Rectal every 4 hours PRN Temp greater or equal to 38.5C (101.3F)  dextrose 40% Gel 15 Gram(s) Oral once PRN Blood Glucose LESS THAN 70 milliGRAM(s)/deciliter  glucagon  Injectable 1 milliGRAM(s) IntraMuscular once PRN Glucose LESS THAN 70 milligrams/deciliter  guaifenesin/dextromethorphan  Syrup 10 milliLiter(s) Oral every 6 hours PRN Cough      Vital Signs Last 24 Hrs  T(C): 36.4 (02 Apr 2020 13:40), Max: 37.6 (01 Apr 2020 20:11)  T(F): 97.5 (02 Apr 2020 13:40), Max: 99.7 (01 Apr 2020 20:11)  HR: 88 (02 Apr 2020 13:40) (85 - 103)  BP: 125/65 (02 Apr 2020 13:40) (87/60 - 125/65)  BP(mean): --  RR: 18 (02 Apr 2020 13:40) (18 - 18)  SpO2: 92% (02 Apr 2020 14:04) (92% - 95%)  CAPILLARY BLOOD GLUCOSE      POCT Blood Glucose.: 214 mg/dL (02 Apr 2020 12:54)  POCT Blood Glucose.: 273 mg/dL (02 Apr 2020 08:43)  POCT Blood Glucose.: 211 mg/dL (01 Apr 2020 21:46)  POCT Blood Glucose.: 249 mg/dL (01 Apr 2020 18:00)    I&O's Summary    01 Apr 2020 07:01  -  02 Apr 2020 07:00  --------------------------------------------------------  IN: 860 mL / OUT: 0 mL / NET: 860 mL    02 Apr 2020 07:01  -  02 Apr 2020 15:07  --------------------------------------------------------  IN: 480 mL / OUT: 0 mL / NET: 480 mL        PHYSICAL EXAM:  GENERAL: NAD, well-developed  HEAD:  Atraumatic, Normocephalic  EYES: EOMI, PERRLA, conjunctiva and sclera clear  NECK: Supple, No JVD  CHEST/LUNG: Clear to auscultation bilaterally; No wheeze  HEART: Regular rate and rhythm; No murmurs, rubs, or gallops  ABDOMEN: Soft, Nontender, Nondistended; Bowel sounds present  EXTREMITIES:  2+ Peripheral Pulses, No clubbing, cyanosis, or edema  PSYCH: AAOx3  NEUROLOGY: non-focal  SKIN: No rashes or lesions    LABS:                        10.9   7.68  )-----------( 336      ( 02 Apr 2020 07:51 )             34.3     04-02    138  |  99  |  11  ----------------------------<  251<H>  3.4<L>   |  22  |  0.68    Ca    7.8<L>      02 Apr 2020 07:40  Phos  2.3     04-02  Mg     2.1     04-02    TPro  6.3  /  Alb  2.5<L>  /  TBili  1.0  /  DBili  x   /  AST  93<H>  /  ALT  107<H>  /  AlkPhos  191<H>  04-02                  RADIOLOGY & ADDITIONAL TESTS:    Imaging Personally Reviewed:    Consultant(s) Notes Reviewed:      Care Discussed with Consultants/Other Providers: Patient is a 64y old  Male who presents with a chief complaint of SOB (02 Apr 2020 14:26)      SUBJECTIVE / OVERNIGHT EVENTS: Patient with no acute complaints.     12 point ros negative except for above    MEDICATIONS  (STANDING):  ascorbic acid 1500 milliGRAM(s) Oral daily  dextrose 5%. 1000 milliLiter(s) (50 mL/Hr) IV Continuous <Continuous>  dextrose 50% Injectable 12.5 Gram(s) IV Push once  dextrose 50% Injectable 25 Gram(s) IV Push once  dextrose 50% Injectable 25 Gram(s) IV Push once  enoxaparin Injectable 40 milliGRAM(s) SubCutaneous daily  hydroxychloroquine   Oral   hydroxychloroquine 200 milliGRAM(s) Oral every 12 hours  influenza   Vaccine 0.5 milliLiter(s) IntraMuscular once  insulin glargine Injectable (LANTUS) 5 Unit(s) SubCutaneous <User Schedule>  insulin lispro (HumaLOG) corrective regimen sliding scale   SubCutaneous three times a day before meals  insulin lispro (HumaLOG) corrective regimen sliding scale   SubCutaneous at bedtime  zinc sulfate 220 milliGRAM(s) Oral daily    MEDICATIONS  (PRN):  acetaminophen   Tablet .. 650 milliGRAM(s) Oral every 4 hours PRN Temp greater or equal to 38.5C (101.3F)  acetaminophen  Suppository .. 650 milliGRAM(s) Rectal every 4 hours PRN Temp greater or equal to 38.5C (101.3F)  dextrose 40% Gel 15 Gram(s) Oral once PRN Blood Glucose LESS THAN 70 milliGRAM(s)/deciliter  glucagon  Injectable 1 milliGRAM(s) IntraMuscular once PRN Glucose LESS THAN 70 milligrams/deciliter  guaifenesin/dextromethorphan  Syrup 10 milliLiter(s) Oral every 6 hours PRN Cough      Vital Signs Last 24 Hrs  T(C): 36.4 (02 Apr 2020 13:40), Max: 37.6 (01 Apr 2020 20:11)  T(F): 97.5 (02 Apr 2020 13:40), Max: 99.7 (01 Apr 2020 20:11)  HR: 88 (02 Apr 2020 13:40) (85 - 103)  BP: 125/65 (02 Apr 2020 13:40) (87/60 - 125/65)  BP(mean): --  RR: 18 (02 Apr 2020 13:40) (18 - 18)  SpO2: 92% (02 Apr 2020 14:04) (92% - 95%)  CAPILLARY BLOOD GLUCOSE      POCT Blood Glucose.: 214 mg/dL (02 Apr 2020 12:54)  POCT Blood Glucose.: 273 mg/dL (02 Apr 2020 08:43)  POCT Blood Glucose.: 211 mg/dL (01 Apr 2020 21:46)  POCT Blood Glucose.: 249 mg/dL (01 Apr 2020 18:00)    I&O's Summary    01 Apr 2020 07:01  -  02 Apr 2020 07:00  --------------------------------------------------------  IN: 860 mL / OUT: 0 mL / NET: 860 mL    02 Apr 2020 07:01  -  02 Apr 2020 15:07  --------------------------------------------------------  IN: 480 mL / OUT: 0 mL / NET: 480 mL        PHYSICAL EXAM:  GENERAL: NAD, well-developed  HEAD:  Atraumatic, Normocephalic  EYES: EOMI, PERRLA, conjunctiva and sclera clear  NECK: Supple, No JVD  CHEST/LUNG: Clear to auscultation bilaterally; No wheeze  HEART: Regular rate and rhythm; No murmurs, rubs, or gallops  ABDOMEN: Soft, Nontender, Nondistended; Bowel sounds present  EXTREMITIES:  2+ Peripheral Pulses, No clubbing, cyanosis, or edema  PSYCH: AAOx3  NEUROLOGY: non-focal  SKIN: No rashes or lesions    LABS:                        10.9   7.68  )-----------( 336      ( 02 Apr 2020 07:51 )             34.3     04-02    138  |  99  |  11  ----------------------------<  251<H>  3.4<L>   |  22  |  0.68    Ca    7.8<L>      02 Apr 2020 07:40  Phos  2.3     04-02  Mg     2.1     04-02    TPro  6.3  /  Alb  2.5<L>  /  TBili  1.0  /  DBili  x   /  AST  93<H>  /  ALT  107<H>  /  AlkPhos  191<H>  04-02                  RADIOLOGY & ADDITIONAL TESTS:    Imaging Personally Reviewed:    Consultant(s) Notes Reviewed:      Care Discussed with Consultants/Other Providers:

## 2020-04-02 NOTE — DISCHARGE NOTE PROVIDER - NSDCFUADDINST_GEN_ALL_CORE_FT
You were diagnosed with coronavirus. Common signs include fever/ and/or respiratory symptoms such as cough and shortness of breath.  It is spread person from person usually after close contact with an infected person through droplets and contact. You were treated with supportive care as there is no specific treatement for disease caused by COVID 19.    It has been determined that you no longer need hospitalization and can recover while remaining in self quarantine at home. Please follow the prevention steps below until a healthcare provider or local or state health department says you can return to normal activities. Please restrict activities outside your home, except for getting medical care. Do not go to work, school or public areas. Avoid using public transportation, ride-sharing or taxis. Separate yourself from other people in your home. Stay in a specific room and away from other people in your home. Use a separate bathroom if available. Call ahead before visiting your doctor. Please wear a facemask when your are around other people. Please clean your hands often with soap and water especially if touching your face or eating. Avoid sharing personal household items. Clean all "high touch" surfaces everyday. Monitor your symptoms for worsening difficulty breathing. You can discontinue home isolation 14 days after positive COVID-19 test. Please call [phone] to speak to a [facility] [place] Coronavirus specialist.

## 2020-04-02 NOTE — PROGRESS NOTE ADULT - ASSESSMENT
65yo male with hx of DM II, presented to the ED with complaints of cough, myalgias, fever and progressively worsening sob for the past several days, noted to have acute respiratory failure with hypoxia likely due to viral Pneumonia rule out COVID

## 2020-04-02 NOTE — PROGRESS NOTE ADULT - ATTENDING COMMENTS
Patient seen and examined today. I agree with the above findings, assessment, and plan with the following additions and exceptions:     On Plaquenil for COVID-19  Optimize K and Mg.   On 6L NC.  Encouraging awake proning and lateral decubitus positioning as tolerated.   Incentive spirometer and OOBTC as tolerated.  MSSI on. Starting Lantus 5 units with dinner. Continue to monitor sugars.    Monitor hemodynamics closely.  Rest of plan as above    Dr. Tremaine Williamson, DO  Attending Physician  Division of Hospital Medicine  [facility]  Pager:  [phone]

## 2020-04-02 NOTE — PROGRESS NOTE ADULT - PROBLEM SELECTOR PLAN 1
- Likely secondary to Viral PNA, Rule out COVID  - CXR with Bilateral Patchy Opacities  - Currently on   - Wean off O2 as tolerated  - Monitor vitals - Likely secondary to Viral PNA, Rule out COVID  - CXR with Bilateral Patchy Opacities  - Wean off O2 as tolerated  - Monitor vitals

## 2020-04-03 LAB
ALBUMIN SERPL ELPH-MCNC: 2.2 G/DL — LOW (ref 3.3–5)
ALP SERPL-CCNC: 215 U/L — HIGH (ref 40–120)
ALT FLD-CCNC: 115 U/L — HIGH (ref 10–45)
ANION GAP SERPL CALC-SCNC: 14 MMOL/L — SIGNIFICANT CHANGE UP (ref 5–17)
AST SERPL-CCNC: 79 U/L — HIGH (ref 10–40)
BILIRUB SERPL-MCNC: 1 MG/DL — SIGNIFICANT CHANGE UP (ref 0.2–1.2)
BUN SERPL-MCNC: 12 MG/DL — SIGNIFICANT CHANGE UP (ref 7–23)
CALCIUM SERPL-MCNC: 7.8 MG/DL — LOW (ref 8.4–10.5)
CHLORIDE SERPL-SCNC: 97 MMOL/L — SIGNIFICANT CHANGE UP (ref 96–108)
CO2 SERPL-SCNC: 23 MMOL/L — SIGNIFICANT CHANGE UP (ref 22–31)
CREAT SERPL-MCNC: 0.78 MG/DL — SIGNIFICANT CHANGE UP (ref 0.5–1.3)
CRP SERPL-MCNC: 17.57 MG/DL — HIGH (ref 0–0.4)
GLUCOSE BLDC GLUCOMTR-MCNC: 115 MG/DL — HIGH (ref 70–99)
GLUCOSE BLDC GLUCOMTR-MCNC: 230 MG/DL — HIGH (ref 70–99)
GLUCOSE BLDC GLUCOMTR-MCNC: 253 MG/DL — HIGH (ref 70–99)
GLUCOSE SERPL-MCNC: 242 MG/DL — HIGH (ref 70–99)
HCT VFR BLD CALC: 33.8 % — LOW (ref 39–50)
HCV AB S/CO SERPL IA: 0.26 S/CO — SIGNIFICANT CHANGE UP (ref 0–0.99)
HCV AB SERPL-IMP: SIGNIFICANT CHANGE UP
HGB BLD-MCNC: 10.7 G/DL — LOW (ref 13–17)
MAGNESIUM SERPL-MCNC: 2.1 MG/DL — SIGNIFICANT CHANGE UP (ref 1.6–2.6)
MCHC RBC-ENTMCNC: 19.9 PG — LOW (ref 27–34)
MCHC RBC-ENTMCNC: 31.7 GM/DL — LOW (ref 32–36)
MCV RBC AUTO: 62.8 FL — LOW (ref 80–100)
NRBC # BLD: 0 /100 WBCS — SIGNIFICANT CHANGE UP (ref 0–0)
PHOSPHATE SERPL-MCNC: 2.7 MG/DL — SIGNIFICANT CHANGE UP (ref 2.5–4.5)
PLATELET # BLD AUTO: 357 K/UL — SIGNIFICANT CHANGE UP (ref 150–400)
POTASSIUM SERPL-MCNC: 3.4 MMOL/L — LOW (ref 3.5–5.3)
POTASSIUM SERPL-SCNC: 3.4 MMOL/L — LOW (ref 3.5–5.3)
PROT SERPL-MCNC: 6.3 G/DL — SIGNIFICANT CHANGE UP (ref 6–8.3)
RBC # BLD: 5.38 M/UL — SIGNIFICANT CHANGE UP (ref 4.2–5.8)
RBC # FLD: 15.1 % — HIGH (ref 10.3–14.5)
SODIUM SERPL-SCNC: 134 MMOL/L — LOW (ref 135–145)
WBC # BLD: 10.78 K/UL — HIGH (ref 3.8–10.5)
WBC # FLD AUTO: 10.78 K/UL — HIGH (ref 3.8–10.5)

## 2020-04-03 PROCEDURE — 93010 ELECTROCARDIOGRAM REPORT: CPT

## 2020-04-03 PROCEDURE — 70450 CT HEAD/BRAIN W/O DYE: CPT | Mod: 26

## 2020-04-03 PROCEDURE — 99233 SBSQ HOSP IP/OBS HIGH 50: CPT | Mod: GC

## 2020-04-03 RX ORDER — ANAKINRA 100MG/0.67
100 SYRINGE (ML) SUBCUTANEOUS
Refills: 0 | Status: DISCONTINUED | OUTPATIENT
Start: 2020-04-03 | End: 2020-04-03

## 2020-04-03 RX ORDER — POTASSIUM CHLORIDE 20 MEQ
40 PACKET (EA) ORAL ONCE
Refills: 0 | Status: COMPLETED | OUTPATIENT
Start: 2020-04-03 | End: 2020-04-03

## 2020-04-03 RX ORDER — INSULIN GLARGINE 100 [IU]/ML
10 INJECTION, SOLUTION SUBCUTANEOUS
Refills: 0 | Status: DISCONTINUED | OUTPATIENT
Start: 2020-04-03 | End: 2020-04-04

## 2020-04-03 RX ORDER — ANAKINRA 100MG/0.67
100 SYRINGE (ML) SUBCUTANEOUS
Refills: 0 | Status: DISCONTINUED | OUTPATIENT
Start: 2020-04-03 | End: 2020-04-04

## 2020-04-03 RX ADMIN — Medication 6: at 14:18

## 2020-04-03 RX ADMIN — Medication 100 MILLIGRAM(S): at 14:17

## 2020-04-03 RX ADMIN — Medication 200 MILLIGRAM(S): at 00:38

## 2020-04-03 RX ADMIN — ENOXAPARIN SODIUM 40 MILLIGRAM(S): 100 INJECTION SUBCUTANEOUS at 12:45

## 2020-04-03 RX ADMIN — Medication 40 MILLIEQUIVALENT(S): at 18:03

## 2020-04-03 RX ADMIN — Medication 4: at 09:06

## 2020-04-03 RX ADMIN — ZINC SULFATE TAB 220 MG (50 MG ZINC EQUIVALENT) 220 MILLIGRAM(S): 220 (50 ZN) TAB at 12:46

## 2020-04-03 RX ADMIN — Medication 100 MILLIGRAM(S): at 18:02

## 2020-04-03 RX ADMIN — Medication 10 MILLILITER(S): at 00:38

## 2020-04-03 RX ADMIN — Medication 1500 MILLIGRAM(S): at 12:45

## 2020-04-03 RX ADMIN — INSULIN GLARGINE 10 UNIT(S): 100 INJECTION, SOLUTION SUBCUTANEOUS at 18:01

## 2020-04-03 NOTE — PROGRESS NOTE ADULT - PROBLEM SELECTOR PLAN 3
- s/p given 500cc bolus for low BP, patient not any BP meds at the moment  - patient now normotensive, will continue to monitor  - Likely from dehydration, will encourage increased  PO intake.

## 2020-04-03 NOTE — PROGRESS NOTE ADULT - PROBLEM SELECTOR PLAN 1
- Viral PNA 2/2 COVID Positive   - CXR with Bilateral Patchy Opacities  - Currently on 2L NC sating 91-92%, will continue to monitor and adjust as necessary

## 2020-04-03 NOTE — DIETITIAN INITIAL EVALUATION ADULT. - PERTINENT LABORATORY DATA
Glucose fingersticks: (4/3) 230-253, (4/2) 181-292, (4/1) 206-249  Na 134, K 3.4, glucose 242  HbA1c 11.6% (4/2)

## 2020-04-03 NOTE — PROGRESS NOTE ADULT - ATTENDING COMMENTS
Patient seen and examined today. I agree with the above findings, assessment, and plan with the following additions and exceptions:     Hold Plaquenil as QTc is 500.   Trial of Anakinra.   Monitor inflammatory markers.   Encouraging awake proning and lateral decubitus positioning as tolerated.   Incentive spirometer and OOBTC as tolerated.  Monitor hemodynamics closely.  Lantus 10 units with dinner. Monitor sliding scale requirements.   Patient is mechanical fall. No clear trauma on exam. No neurologic deficits on exam. CT head noncontrast pending.   Rest of plan as above    Dr. Tremaine Williamson,   Attending Physician  Division of Hospital Medicine  [facility]  Pager:  [phone]

## 2020-04-03 NOTE — DIETITIAN INITIAL EVALUATION ADULT. - PHYSICAL APPEARANCE
Unable to conduct nutrition-focused physical exam at this time due to limited contact restrictions related to pt's medical condition and isolation precautions./other (specify) Ht: 68 inches (172.72 cm) Wt: 154 pounds (70 kg)  BMI: 23.5 kg/m2  IBW: 154 pounds +/-10% %IBW: 100%  Skin: no pressure injuries per flowsheets   Edema: no edema per flowsheets

## 2020-04-03 NOTE — DIETITIAN INITIAL EVALUATION ADULT. - ADD RECOMMEND
1) Continue current diet: consistent carbohydrate (evening snack) - monitor/adjust as needed 2) RD to provide diet Mighty Shake 2x/day (200 kcal, 7 gm protein in each) to optimize intake 3) Suggest multivitamin supplementation as medically feasible to optimize nutrient intake 4) Wife made aware RD remains available; RD to continue to obtain/honor food preferences as feasible, provide diet education as able 5) Monitor PO intake, weight, labs, skin, GI status, diet

## 2020-04-03 NOTE — PROGRESS NOTE ADULT - PROBLEM SELECTOR PLAN 4
- Fingersticks elevated to >200s   - will start on Lantus 5U at dinnertime   - will reassess morning blood sugar level and adjust insulin regimen as necessary - Fingersticks elevated to >200s   - Increase Lantus 10 units at dinnertime   - will reassess morning blood sugar level and adjust insulin regimen as necessary

## 2020-04-03 NOTE — DIETITIAN INITIAL EVALUATION ADULT. - DOB: +DATEOFBIRTH
Statement Selected Itraconazole Counseling:  I discussed with the patient the risks of itraconazole including but not limited to liver damage, nausea/vomiting, neuropathy, and severe allergy.  The patient understands that this medication is best absorbed when taken with acidic beverages such as non-diet cola or ginger ale.  The patient understands that monitoring is required including baseline LFTs and repeat LFTs at intervals.  The patient understands that they are to contact us or the primary physician if concerning signs are noted.

## 2020-04-03 NOTE — PROGRESS NOTE ADULT - ASSESSMENT
63yo male with hx of DM II, presented to the ED with complaints of cough, myalgias, fever and progressively worsening sob for the past several days, noted to have acute respiratory failure with hypoxia likely due to viral Pneumonia 2/2 to COVID

## 2020-04-03 NOTE — DIETITIAN INITIAL EVALUATION ADULT. - REASON INDICATOR FOR ASSESSMENT
Pt seen for length of stay assessment. Source: comprehensive chart review, pt's wife via phone ([phone]). Pt is a 63 yo male with PMH of DM2, who presented with cough, myalgias, fever and progressively worsening SOB, admitted 3/31 with acute respiratory failure with hypoxia likely due to viral pneumonia secondary to COVID-19.

## 2020-04-03 NOTE — PROGRESS NOTE ADULT - PROBLEM SELECTOR PLAN 2
- COVID POSITIVE  - c/e with plaquenil for total 5 day course of treatment   - will continue to wean off O2 as tolerated and monitor oxygenation status - COVID POSITIVE  - D/c plaquenil as Qtc 500.   - will continue to wean off O2 as tolerated and monitor oxygenation status  - Trial of Anakinra given significant elevation of ferritin, elevation in CRP, and increased oxygen requirements from 2L NC to 5L NC.

## 2020-04-03 NOTE — PROGRESS NOTE ADULT - SUBJECTIVE AND OBJECTIVE BOX
Patient is a 64y old  Male who presents with a chief complaint of SOB (02 Apr 2020 15:07)      SUBJECTIVE / OVERNIGHT EVENTS: Patient with elevated temp t0 100.2. Soft BPs overnight. Otherwise no acute events overnight.     MEDICATIONS  (STANDING):  ascorbic acid 1500 milliGRAM(s) Oral daily  dextrose 5%. 1000 milliLiter(s) (50 mL/Hr) IV Continuous <Continuous>  dextrose 50% Injectable 12.5 Gram(s) IV Push once  dextrose 50% Injectable 25 Gram(s) IV Push once  dextrose 50% Injectable 25 Gram(s) IV Push once  enoxaparin Injectable 40 milliGRAM(s) SubCutaneous daily  hydroxychloroquine   Oral   hydroxychloroquine 200 milliGRAM(s) Oral every 12 hours  influenza   Vaccine 0.5 milliLiter(s) IntraMuscular once  insulin glargine Injectable (LANTUS) 5 Unit(s) SubCutaneous <User Schedule>  insulin lispro (HumaLOG) corrective regimen sliding scale   SubCutaneous three times a day before meals  insulin lispro (HumaLOG) corrective regimen sliding scale   SubCutaneous at bedtime  zinc sulfate 220 milliGRAM(s) Oral daily    MEDICATIONS  (PRN):  acetaminophen   Tablet .. 650 milliGRAM(s) Oral every 4 hours PRN Temp greater or equal to 38.5C (101.3F)  acetaminophen  Suppository .. 650 milliGRAM(s) Rectal every 4 hours PRN Temp greater or equal to 38.5C (101.3F)  dextrose 40% Gel 15 Gram(s) Oral once PRN Blood Glucose LESS THAN 70 milliGRAM(s)/deciliter  glucagon  Injectable 1 milliGRAM(s) IntraMuscular once PRN Glucose LESS THAN 70 milligrams/deciliter  guaifenesin/dextromethorphan  Syrup 10 milliLiter(s) Oral every 6 hours PRN Cough      Vital Signs Last 24 Hrs  T(C): 37.9 (03 Apr 2020 05:57), Max: 37.9 (03 Apr 2020 05:57)  T(F): 100.2 (03 Apr 2020 05:57), Max: 100.2 (03 Apr 2020 05:57)  HR: 90 (03 Apr 2020 05:57) (88 - 95)  BP: 108/72 (03 Apr 2020 05:57) (102/68 - 125/65)  BP(mean): --  RR: 20 (03 Apr 2020 05:57) (18 - 20)  SpO2: 91% (03 Apr 2020 05:57) (91% - 95%)  CAPILLARY BLOOD GLUCOSE      POCT Blood Glucose.: 237 mg/dL (02 Apr 2020 22:37)  POCT Blood Glucose.: 292 mg/dL (02 Apr 2020 21:05)  POCT Blood Glucose.: 181 mg/dL (02 Apr 2020 17:36)  POCT Blood Glucose.: 214 mg/dL (02 Apr 2020 12:54)  POCT Blood Glucose.: 273 mg/dL (02 Apr 2020 08:43)    I&O's Summary    02 Apr 2020 07:01  -  03 Apr 2020 07:00  --------------------------------------------------------  IN: 480 mL / OUT: 0 mL / NET: 480 mL        PHYSICAL EXAM:  GENERAL: On 2L NC   HEAD:  Atraumatic, Normocephalic  EYES: EOMI, PERRLA, conjunctiva and sclera clear  NECK: Supple, No JVD  CHEST/LUNG: Clear to auscultation bilaterally; No wheeze  HEART: Regular rate and rhythm; No murmurs, rubs, or gallops  ABDOMEN: Soft, Nontender, Nondistended; Bowel sounds present      LABS:                        10.9   7.68  )-----------( 336      ( 02 Apr 2020 07:51 )             34.3     04-02    138  |  99  |  11  ----------------------------<  251<H>  3.4<L>   |  22  |  0.68    Ca    7.8<L>      02 Apr 2020 07:40  Phos  2.3     04-02  Mg     2.1     04-02    TPro  6.3  /  Alb  2.5<L>  /  TBili  1.0  /  DBili  x   /  AST  93<H>  /  ALT  107<H>  /  AlkPhos  191<H>  04-02 Patient is a 64y old  Male who presents with a chief complaint of SOB (02 Apr 2020 15:07)      SUBJECTIVE / OVERNIGHT EVENTS: Patient with elevated temp to 100.2. Soft BPs overnight.   Reports slipping overnight on a wet floor.  No associated dizziness, headache, or any other source of pain.   NO dyspnea.    12 point ros negative except for above    MEDICATIONS  (STANDING):  ascorbic acid 1500 milliGRAM(s) Oral daily  dextrose 5%. 1000 milliLiter(s) (50 mL/Hr) IV Continuous <Continuous>  dextrose 50% Injectable 12.5 Gram(s) IV Push once  dextrose 50% Injectable 25 Gram(s) IV Push once  dextrose 50% Injectable 25 Gram(s) IV Push once  enoxaparin Injectable 40 milliGRAM(s) SubCutaneous daily  hydroxychloroquine   Oral   hydroxychloroquine 200 milliGRAM(s) Oral every 12 hours  influenza   Vaccine 0.5 milliLiter(s) IntraMuscular once  insulin glargine Injectable (LANTUS) 5 Unit(s) SubCutaneous <User Schedule>  insulin lispro (HumaLOG) corrective regimen sliding scale   SubCutaneous three times a day before meals  insulin lispro (HumaLOG) corrective regimen sliding scale   SubCutaneous at bedtime  zinc sulfate 220 milliGRAM(s) Oral daily    MEDICATIONS  (PRN):  acetaminophen   Tablet .. 650 milliGRAM(s) Oral every 4 hours PRN Temp greater or equal to 38.5C (101.3F)  acetaminophen  Suppository .. 650 milliGRAM(s) Rectal every 4 hours PRN Temp greater or equal to 38.5C (101.3F)  dextrose 40% Gel 15 Gram(s) Oral once PRN Blood Glucose LESS THAN 70 milliGRAM(s)/deciliter  glucagon  Injectable 1 milliGRAM(s) IntraMuscular once PRN Glucose LESS THAN 70 milligrams/deciliter  guaifenesin/dextromethorphan  Syrup 10 milliLiter(s) Oral every 6 hours PRN Cough      Vital Signs Last 24 Hrs  T(C): 37.9 (03 Apr 2020 05:57), Max: 37.9 (03 Apr 2020 05:57)  T(F): 100.2 (03 Apr 2020 05:57), Max: 100.2 (03 Apr 2020 05:57)  HR: 90 (03 Apr 2020 05:57) (88 - 95)  BP: 108/72 (03 Apr 2020 05:57) (102/68 - 125/65)  BP(mean): --  RR: 20 (03 Apr 2020 05:57) (18 - 20)  SpO2: 91% (03 Apr 2020 05:57) (91% - 95%)  CAPILLARY BLOOD GLUCOSE      POCT Blood Glucose.: 237 mg/dL (02 Apr 2020 22:37)  POCT Blood Glucose.: 292 mg/dL (02 Apr 2020 21:05)  POCT Blood Glucose.: 181 mg/dL (02 Apr 2020 17:36)  POCT Blood Glucose.: 214 mg/dL (02 Apr 2020 12:54)  POCT Blood Glucose.: 273 mg/dL (02 Apr 2020 08:43)    I&O's Summary    02 Apr 2020 07:01  -  03 Apr 2020 07:00  --------------------------------------------------------  IN: 480 mL / OUT: 0 mL / NET: 480 mL        PHYSICAL EXAM:  GENERAL: On 2L NC   HEAD:  Atraumatic, Normocephalic  EYES: EOMI, PERRLA, conjunctiva and sclera clear  NECK: Supple, No JVD  CHEST/LUNG: Clear to auscultation bilaterally; No wheeze  HEART: Regular rate and rhythm; No murmurs, rubs, or gallops  ABDOMEN: Soft, Nontender, Nondistended; Bowel sounds present  MSK: no midline spinal tenderness.   NEURO: moving all ext. following commands.      LABS:                        10.9   7.68  )-----------( 336      ( 02 Apr 2020 07:51 )             34.3     04-02    138  |  99  |  11  ----------------------------<  251<H>  3.4<L>   |  22  |  0.68    Ca    7.8<L>      02 Apr 2020 07:40  Phos  2.3     04-02  Mg     2.1     04-02    TPro  6.3  /  Alb  2.5<L>  /  TBili  1.0  /  DBili  x   /  AST  93<H>  /  ALT  107<H>  /  AlkPhos  191<H>  04-02

## 2020-04-04 LAB
ALBUMIN SERPL ELPH-MCNC: 2.3 G/DL — LOW (ref 3.3–5)
ALP SERPL-CCNC: 204 U/L — HIGH (ref 40–120)
ALT FLD-CCNC: 85 U/L — HIGH (ref 10–45)
ANION GAP SERPL CALC-SCNC: 16 MMOL/L — SIGNIFICANT CHANGE UP (ref 5–17)
AST SERPL-CCNC: 48 U/L — HIGH (ref 10–40)
BILIRUB SERPL-MCNC: 0.8 MG/DL — SIGNIFICANT CHANGE UP (ref 0.2–1.2)
BUN SERPL-MCNC: 13 MG/DL — SIGNIFICANT CHANGE UP (ref 7–23)
CALCIUM SERPL-MCNC: 7.5 MG/DL — LOW (ref 8.4–10.5)
CHLORIDE SERPL-SCNC: 97 MMOL/L — SIGNIFICANT CHANGE UP (ref 96–108)
CO2 SERPL-SCNC: 23 MMOL/L — SIGNIFICANT CHANGE UP (ref 22–31)
CREAT SERPL-MCNC: 0.72 MG/DL — SIGNIFICANT CHANGE UP (ref 0.5–1.3)
CRP SERPL-MCNC: 18.29 MG/DL — HIGH (ref 0–0.4)
FERRITIN SERPL-MCNC: 846 NG/ML — HIGH (ref 30–400)
GLUCOSE BLDC GLUCOMTR-MCNC: 200 MG/DL — HIGH (ref 70–99)
GLUCOSE BLDC GLUCOMTR-MCNC: 217 MG/DL — HIGH (ref 70–99)
GLUCOSE BLDC GLUCOMTR-MCNC: 227 MG/DL — HIGH (ref 70–99)
GLUCOSE SERPL-MCNC: 226 MG/DL — HIGH (ref 70–99)
HCT VFR BLD CALC: 34.6 % — LOW (ref 39–50)
HGB BLD-MCNC: 11 G/DL — LOW (ref 13–17)
MAGNESIUM SERPL-MCNC: 2 MG/DL — SIGNIFICANT CHANGE UP (ref 1.6–2.6)
MCHC RBC-ENTMCNC: 20 PG — LOW (ref 27–34)
MCHC RBC-ENTMCNC: 31.8 GM/DL — LOW (ref 32–36)
MCV RBC AUTO: 62.8 FL — LOW (ref 80–100)
NRBC # BLD: 0 /100 WBCS — SIGNIFICANT CHANGE UP (ref 0–0)
PHOSPHATE SERPL-MCNC: 2.5 MG/DL — SIGNIFICANT CHANGE UP (ref 2.5–4.5)
PLATELET # BLD AUTO: 361 K/UL — SIGNIFICANT CHANGE UP (ref 150–400)
POTASSIUM SERPL-MCNC: 3.4 MMOL/L — LOW (ref 3.5–5.3)
POTASSIUM SERPL-SCNC: 3.4 MMOL/L — LOW (ref 3.5–5.3)
PROT SERPL-MCNC: 6.3 G/DL — SIGNIFICANT CHANGE UP (ref 6–8.3)
RBC # BLD: 5.51 M/UL — SIGNIFICANT CHANGE UP (ref 4.2–5.8)
RBC # FLD: 15.1 % — HIGH (ref 10.3–14.5)
SODIUM SERPL-SCNC: 136 MMOL/L — SIGNIFICANT CHANGE UP (ref 135–145)
WBC # BLD: 9.43 K/UL — SIGNIFICANT CHANGE UP (ref 3.8–10.5)
WBC # FLD AUTO: 9.43 K/UL — SIGNIFICANT CHANGE UP (ref 3.8–10.5)

## 2020-04-04 PROCEDURE — 99232 SBSQ HOSP IP/OBS MODERATE 35: CPT | Mod: GC

## 2020-04-04 RX ORDER — POLYETHYLENE GLYCOL 3350 17 G/17G
17 POWDER, FOR SOLUTION ORAL AT BEDTIME
Refills: 0 | Status: DISCONTINUED | OUTPATIENT
Start: 2020-04-04 | End: 2020-04-05

## 2020-04-04 RX ORDER — INSULIN GLARGINE 100 [IU]/ML
13 INJECTION, SOLUTION SUBCUTANEOUS
Refills: 0 | Status: DISCONTINUED | OUTPATIENT
Start: 2020-04-04 | End: 2020-04-05

## 2020-04-04 RX ORDER — SENNA PLUS 8.6 MG/1
2 TABLET ORAL AT BEDTIME
Refills: 0 | Status: DISCONTINUED | OUTPATIENT
Start: 2020-04-04 | End: 2020-04-14

## 2020-04-04 RX ORDER — POTASSIUM CHLORIDE 20 MEQ
40 PACKET (EA) ORAL ONCE
Refills: 0 | Status: COMPLETED | OUTPATIENT
Start: 2020-04-04 | End: 2020-04-04

## 2020-04-04 RX ADMIN — Medication 100 MILLIGRAM(S): at 06:04

## 2020-04-04 RX ADMIN — Medication 10 MILLILITER(S): at 00:34

## 2020-04-04 RX ADMIN — INSULIN GLARGINE 13 UNIT(S): 100 INJECTION, SOLUTION SUBCUTANEOUS at 18:20

## 2020-04-04 RX ADMIN — Medication 650 MILLIGRAM(S): at 22:16

## 2020-04-04 RX ADMIN — Medication 4: at 09:45

## 2020-04-04 RX ADMIN — Medication 100 MILLIGRAM(S): at 00:34

## 2020-04-04 RX ADMIN — Medication 1500 MILLIGRAM(S): at 11:50

## 2020-04-04 RX ADMIN — Medication 10 MILLILITER(S): at 22:16

## 2020-04-04 RX ADMIN — Medication 2: at 13:10

## 2020-04-04 RX ADMIN — Medication 40 MILLIEQUIVALENT(S): at 11:49

## 2020-04-04 RX ADMIN — POLYETHYLENE GLYCOL 3350 17 GRAM(S): 17 POWDER, FOR SOLUTION ORAL at 22:15

## 2020-04-04 RX ADMIN — SENNA PLUS 2 TABLET(S): 8.6 TABLET ORAL at 22:16

## 2020-04-04 RX ADMIN — ENOXAPARIN SODIUM 40 MILLIGRAM(S): 100 INJECTION SUBCUTANEOUS at 11:50

## 2020-04-04 RX ADMIN — Medication 4: at 18:19

## 2020-04-04 NOTE — PROGRESS NOTE ADULT - PROBLEM SELECTOR PROBLEM 3
Hypotension due to hypovolemia Type 2 diabetes mellitus without complication, without long-term current use of insulin

## 2020-04-04 NOTE — PROGRESS NOTE ADULT - ATTENDING COMMENTS
Patient seen and examined today. I agree with the above findings, assessment, and plan with the following additions and exceptions:     Hold Plaquenil as QTc is 500.   Trial of Anakinra given increased o2 requirements.   Monitor inflammatory markers. CRP uptrended.   Encouraging awake proning and lateral decubitus positioning as tolerated.   Incentive spirometer and OOBTC as tolerated.  Monitor hemodynamics closely.  Adjust insulin based on SSI requirements today.    Patient is mechanical fall. No clear trauma on exam. No neurologic deficits on exam. CT head noncontrast pending.   Rest of plan as above    Dr. Tremaine Williamson,   Attending Physician  Division of Hospital Medicine  [facility]  Pager:  [phone] Patient seen and examined today. I agree with the above findings, assessment, and plan with the following additions and exceptions:     Hold Plaquenil as QTc is 500.   Holding anakinra as patient not requiring NRB. Uptrending O2 requirements however from 2L to 5L NC. Has mild dyspnea. If NRB needed, we may continue.   Monitor inflammatory markers. CRP uptrended.   Encouraging awake proning and lateral decubitus positioning as tolerated.   Incentive spirometer and OOBTC as tolerated.  Monitor hemodynamics closely.  Adjust insulin based on SSI requirements today.    Patient is mechanical fall. No clear trauma on exam. No neurologic deficits on exam. CT head noncontrast pending.   Rest of plan as above    Dr. Tremaine Williamson DO  Attending Physician  Division of Hospital Medicine  [facility]  Pager:  [phone]

## 2020-04-04 NOTE — PROGRESS NOTE ADULT - PROBLEM SELECTOR PROBLEM 4
Type 2 diabetes mellitus without complication, without long-term current use of insulin Prophylactic measure

## 2020-04-04 NOTE — PROGRESS NOTE ADULT - PROBLEM SELECTOR PLAN 2
- COVID POSITIVE  - D/c plaquenil as Qtc 500.   - will continue to wean off O2 as tolerated and monitor oxygenation status  - Trial of Anakinra given significant elevation of ferritin, elevation in CRP, and increased oxygen requirements from 2L NC to 5L NC. - COVID POSITIVE  - D/kyle plaquenil as Qtc 500.   - will continue to wean off O2 as tolerated and monitor oxygenation status  - Trial of Anakinra given significant elevation of ferritin, elevation in CRP, and increased oxygen requirements from 2L NC to 5L NC. - COVID POSITIVE  - D/kyle plaquenil as Qtc 500.   - will continue to wean off O2 as tolerated and monitor oxygenation status

## 2020-04-04 NOTE — PROGRESS NOTE ADULT - PROBLEM SELECTOR PLAN 4
- Fingersticks elevated to >200s   - Increase Lantus 10 units at dinnertime   - will reassess morning blood sugar level and adjust insulin regimen as necessary Diet: carb consistent  DVT ppx Lovenox

## 2020-04-04 NOTE — PROGRESS NOTE ADULT - SUBJECTIVE AND OBJECTIVE BOX
Collin Henny, PGY2  Pager [phone]    INCOMPLETE NOTE - IN PROGRESS    Patient is a 64y old  Male who presents with a chief complaint of SOB (2020 07:37)      SUBJECTIVE/INTERVAL EVENTS: Patient seen and examined at bedside.    MEDICATIONS  (STANDING):  anakinra Injectable 100 milliGRAM(s) SubCutaneous <User Schedule>  ascorbic acid 1500 milliGRAM(s) Oral daily  dextrose 5%. 1000 milliLiter(s) (50 mL/Hr) IV Continuous <Continuous>  dextrose 50% Injectable 12.5 Gram(s) IV Push once  dextrose 50% Injectable 25 Gram(s) IV Push once  dextrose 50% Injectable 25 Gram(s) IV Push once  enoxaparin Injectable 40 milliGRAM(s) SubCutaneous daily  influenza   Vaccine 0.5 milliLiter(s) IntraMuscular once  insulin glargine Injectable (LANTUS) 10 Unit(s) SubCutaneous <User Schedule>  insulin lispro (HumaLOG) corrective regimen sliding scale   SubCutaneous three times a day before meals  zinc sulfate 220 milliGRAM(s) Oral daily    MEDICATIONS  (PRN):  acetaminophen   Tablet .. 650 milliGRAM(s) Oral every 4 hours PRN Temp greater or equal to 38.5C (101.3F)  acetaminophen  Suppository .. 650 milliGRAM(s) Rectal every 4 hours PRN Temp greater or equal to 38.5C (101.3F)  dextrose 40% Gel 15 Gram(s) Oral once PRN Blood Glucose LESS THAN 70 milliGRAM(s)/deciliter  glucagon  Injectable 1 milliGRAM(s) IntraMuscular once PRN Glucose LESS THAN 70 milligrams/deciliter  guaifenesin/dextromethorphan  Syrup 10 milliLiter(s) Oral every 6 hours PRN Cough      VITAL SIGNS:  T(F): 98.8 (20 @ 06:03), Max: 100 (20 @ 14:15)  HR: 86 (20 @ 06:03) (86 - 99)  BP: 105/74 (20 @ 06:03) (104/63 - 106/67)  RR: 20 (20 @ 06:03) (18 - 20)  SpO2: 95% (20 @ 06:03) (89% - 95%)    I&O's Summary    2020 07:01  -  2020 07:00  --------------------------------------------------------  IN: 0 mL / OUT: 400 mL / NET: -400 mL      Daily     Daily Weight in k.8 (2020 15:50)    PHYSICAL EXAM:  Gen: Alert, NAD  HEENT: NCAT, conjunctiva clear, sclera anicteric, no erythema or exudates in the oropharynx, mmm  Neck: Supple, no JVD  CV: RRR, S1S2, no m/r/g  Resp: CTAB, normal respiratory effort  Abd: Soft, nontender, nondistended, normal bowel sounds  Ext: no edema, no clubbing or cyanosis  Neuro: AOx3, CN2-12 grossly intact, LÓPEZ  SKIN: warm, perfused    LABS:                        10.7   10.78 )-----------( 357      ( 2020 07:37 )             33.8     Hgb Trend: 10.7<--, 10.9<--, 10.7<--, 11.5<--  04-03    134<L>  |  97  |  12  ----------------------------<  242<H>  3.4<L>   |  23  |  0.78    Ca    7.8<L>      2020 07:35  Phos  2.7     04-03  Mg     2.1     -03    TPro  6.3  /  Alb  2.2<L>  /  TBili  1.0  /  DBili  x   /  AST  79<H>  /  ALT  115<H>  /  AlkPhos  215<H>      Creatinine Trend: 0.78<--, 0.68<--, 0.69<--, 0.76<--  LIVER FUNCTIONS - ( 2020 07:35 )  Alb: 2.2 g/dL / Pro: 6.3 g/dL / ALK PHOS: 215 U/L / ALT: 115 U/L / AST: 79 U/L / GGT: x                     CAPILLARY BLOOD GLUCOSE      POCT Blood Glucose.: 115 mg/dL (2020 17:06)  POCT Blood Glucose.: 253 mg/dL (2020 13:21)  POCT Blood Glucose.: 230 mg/dL (2020 07:58)      RADIOLOGY & ADDITIONAL TESTS: Reviewed    Imaging Personally Reviewed:    Consultant(s) Notes Reviewed:      Care Discussed with Consultants/Other Providers: Collin Inman, PGY2  Pager [phone]      Patient is a 64y old  Male who presents with a chief complaint of SOB (2020 07:37)      SUBJECTIVE/INTERVAL EVENTS: Patient seen and examined at bedside. TARA. Feels better overall. No acute complaints.    MEDICATIONS  (STANDING):  anakinra Injectable 100 milliGRAM(s) SubCutaneous <User Schedule>  ascorbic acid 1500 milliGRAM(s) Oral daily  dextrose 5%. 1000 milliLiter(s) (50 mL/Hr) IV Continuous <Continuous>  dextrose 50% Injectable 12.5 Gram(s) IV Push once  dextrose 50% Injectable 25 Gram(s) IV Push once  dextrose 50% Injectable 25 Gram(s) IV Push once  enoxaparin Injectable 40 milliGRAM(s) SubCutaneous daily  influenza   Vaccine 0.5 milliLiter(s) IntraMuscular once  insulin glargine Injectable (LANTUS) 10 Unit(s) SubCutaneous <User Schedule>  insulin lispro (HumaLOG) corrective regimen sliding scale   SubCutaneous three times a day before meals  zinc sulfate 220 milliGRAM(s) Oral daily    MEDICATIONS  (PRN):  acetaminophen   Tablet .. 650 milliGRAM(s) Oral every 4 hours PRN Temp greater or equal to 38.5C (101.3F)  acetaminophen  Suppository .. 650 milliGRAM(s) Rectal every 4 hours PRN Temp greater or equal to 38.5C (101.3F)  dextrose 40% Gel 15 Gram(s) Oral once PRN Blood Glucose LESS THAN 70 milliGRAM(s)/deciliter  glucagon  Injectable 1 milliGRAM(s) IntraMuscular once PRN Glucose LESS THAN 70 milligrams/deciliter  guaifenesin/dextromethorphan  Syrup 10 milliLiter(s) Oral every 6 hours PRN Cough      VITAL SIGNS:  T(F): 98.8 (20 @ 06:03), Max: 100 (20 @ 14:15)  HR: 86 (20 @ 06:03) (86 - 99)  BP: 105/74 (20 @ 06:03) (104/63 - 106/67)  RR: 20 (20 @ 06:03) (18 - 20)  SpO2: 95% (20 @ 06:03) (89% - 95%)    I&O's Summary    2020 07:01  -  2020 07:00  --------------------------------------------------------  IN: 0 mL / OUT: 400 mL / NET: -400 mL      Daily     Daily Weight in k.8 (2020 15:50)    PHYSICAL EXAM:  Gen: Alert, NAD  HEENT: NCAT, conjunctiva clear, sclera anicteric  Neck: Supple, no JVD  CV: RRR, S1S2, no m/r/g  Resp: Scattered rales, no wheezing, normal respiratory effort  Abd: Soft, nontender, nondistended  Ext: no edema, no clubbing or cyanosis  Neuro: Alert, CN2-12 grossly intact, LÓPEZ  SKIN: warm, perfused    LABS:                        10.7   10.78 )-----------( 357      ( 2020 07:37 )             33.8     Hgb Trend: 10.7<--, 10.9<--, 10.7<--, 11.5<--  04-03    134<L>  |  97  |  12  ----------------------------<  242<H>  3.4<L>   |  23  |  0.78    Ca    7.8<L>      2020 07:35  Phos  2.7     04-03  Mg     2.1     04-03    TPro  6.3  /  Alb  2.2<L>  /  TBili  1.0  /  DBili  x   /  AST  79<H>  /  ALT  115<H>  /  AlkPhos  215<H>  -    Creatinine Trend: 0.78<--, 0.68<--, 0.69<--, 0.76<--  LIVER FUNCTIONS - ( 2020 07:35 )  Alb: 2.2 g/dL / Pro: 6.3 g/dL / ALK PHOS: 215 U/L / ALT: 115 U/L / AST: 79 U/L / GGT: x                     CAPILLARY BLOOD GLUCOSE      POCT Blood Glucose.: 115 mg/dL (2020 17:06)  POCT Blood Glucose.: 253 mg/dL (2020 13:21)  POCT Blood Glucose.: 230 mg/dL (2020 07:58)      RADIOLOGY & ADDITIONAL TESTS: Reviewed    Imaging Personally Reviewed:    Consultant(s) Notes Reviewed:      Care Discussed with Consultants/Other Providers: Collin Inman, PGY2  Pager [phone]      Patient is a 64y old  Male who presents with a chief complaint of SOB (2020 07:37)      SUBJECTIVE/INTERVAL EVENTS: Patient seen and examined at bedside. TARA. Feels better overall. No acute complaints.  12 point ros negative except for above    MEDICATIONS  (STANDING):  anakinra Injectable 100 milliGRAM(s) SubCutaneous <User Schedule>  ascorbic acid 1500 milliGRAM(s) Oral daily  dextrose 5%. 1000 milliLiter(s) (50 mL/Hr) IV Continuous <Continuous>  dextrose 50% Injectable 12.5 Gram(s) IV Push once  dextrose 50% Injectable 25 Gram(s) IV Push once  dextrose 50% Injectable 25 Gram(s) IV Push once  enoxaparin Injectable 40 milliGRAM(s) SubCutaneous daily  influenza   Vaccine 0.5 milliLiter(s) IntraMuscular once  insulin glargine Injectable (LANTUS) 10 Unit(s) SubCutaneous <User Schedule>  insulin lispro (HumaLOG) corrective regimen sliding scale   SubCutaneous three times a day before meals  zinc sulfate 220 milliGRAM(s) Oral daily    MEDICATIONS  (PRN):  acetaminophen   Tablet .. 650 milliGRAM(s) Oral every 4 hours PRN Temp greater or equal to 38.5C (101.3F)  acetaminophen  Suppository .. 650 milliGRAM(s) Rectal every 4 hours PRN Temp greater or equal to 38.5C (101.3F)  dextrose 40% Gel 15 Gram(s) Oral once PRN Blood Glucose LESS THAN 70 milliGRAM(s)/deciliter  glucagon  Injectable 1 milliGRAM(s) IntraMuscular once PRN Glucose LESS THAN 70 milligrams/deciliter  guaifenesin/dextromethorphan  Syrup 10 milliLiter(s) Oral every 6 hours PRN Cough      VITAL SIGNS:  T(F): 98.8 (20 @ 06:03), Max: 100 (20 @ 14:15)  HR: 86 (20 @ 06:03) (86 - 99)  BP: 105/74 (20 @ 06:03) (104/63 - 106/67)  RR: 20 (20 @ 06:03) (18 - 20)  SpO2: 95% (20 @ 06:03) (89% - 95%)    I&O's Summary    2020 07:01  -  2020 07:00  --------------------------------------------------------  IN: 0 mL / OUT: 400 mL / NET: -400 mL      Daily     Daily Weight in k.8 (2020 15:50)    PHYSICAL EXAM:  Gen: Alert, NAD  HEENT: NCAT, conjunctiva clear, sclera anicteric  Neck: Supple, no JVD  CV: RRR, S1S2, no m/r/g  Resp: Scattered rales, no wheezing, normal respiratory effort  Abd: Soft, nontender, nondistended  Ext: no edema, no clubbing or cyanosis  Neuro: Alert, CN2-12 grossly intact, LÓPEZ  SKIN: warm, perfused    LABS:                        10.7   10.78 )-----------( 357      ( 2020 07:37 )             33.8     Hgb Trend: 10.7<--, 10.9<--, 10.7<--, 11.5<--  04-03    134<L>  |  97  |  12  ----------------------------<  242<H>  3.4<L>   |  23  |  0.78    Ca    7.8<L>      2020 07:35  Phos  2.7     04-03  Mg     2.1     04-03    TPro  6.3  /  Alb  2.2<L>  /  TBili  1.0  /  DBili  x   /  AST  79<H>  /  ALT  115<H>  /  AlkPhos  215<H>  -    Creatinine Trend: 0.78<--, 0.68<--, 0.69<--, 0.76<--  LIVER FUNCTIONS - ( 2020 07:35 )  Alb: 2.2 g/dL / Pro: 6.3 g/dL / ALK PHOS: 215 U/L / ALT: 115 U/L / AST: 79 U/L / GGT: x                     CAPILLARY BLOOD GLUCOSE      POCT Blood Glucose.: 115 mg/dL (2020 17:06)  POCT Blood Glucose.: 253 mg/dL (2020 13:21)  POCT Blood Glucose.: 230 mg/dL (2020 07:58)      RADIOLOGY & ADDITIONAL TESTS: Reviewed    Imaging Personally Reviewed:    Consultant(s) Notes Reviewed:      Care Discussed with Consultants/Other Providers:

## 2020-04-04 NOTE — PROGRESS NOTE ADULT - PROBLEM SELECTOR PLAN 1
- Viral PNA 2/2 COVID Positive   - CXR with Bilateral Patchy Opacities  - Currently on 2L NC sating 91-92%, will continue to monitor and adjust as necessary - Viral PNA 2/2 COVID Positive   - CXR with Bilateral Patchy Opacities  - Currently on 5L NC sating mid 90s, will continue to monitor and adjust as necessary

## 2020-04-04 NOTE — PROGRESS NOTE ADULT - PROBLEM SELECTOR PLAN 3
- s/p given 500cc bolus for low BP, patient not any BP meds at the moment  - patient now normotensive, will continue to monitor  - Likely from dehydration, will encourage increased  PO intake. - Fingersticks elevated to >200s   - Increased Lantus 10 units at dinnertime   - will reassess morning blood sugar level and adjust insulin regimen as necessary - Fingersticks elevated to >200s   - adjust insulin regimen as necessary

## 2020-04-05 LAB
ALBUMIN SERPL ELPH-MCNC: 2.4 G/DL — LOW (ref 3.3–5)
ALP SERPL-CCNC: 201 U/L — HIGH (ref 40–120)
ALT FLD-CCNC: 69 U/L — HIGH (ref 10–45)
ANION GAP SERPL CALC-SCNC: 14 MMOL/L — SIGNIFICANT CHANGE UP (ref 5–17)
AST SERPL-CCNC: 37 U/L — SIGNIFICANT CHANGE UP (ref 10–40)
BILIRUB SERPL-MCNC: 0.6 MG/DL — SIGNIFICANT CHANGE UP (ref 0.2–1.2)
BUN SERPL-MCNC: 12 MG/DL — SIGNIFICANT CHANGE UP (ref 7–23)
CALCIUM SERPL-MCNC: 7.8 MG/DL — LOW (ref 8.4–10.5)
CHLORIDE SERPL-SCNC: 97 MMOL/L — SIGNIFICANT CHANGE UP (ref 96–108)
CO2 SERPL-SCNC: 25 MMOL/L — SIGNIFICANT CHANGE UP (ref 22–31)
CREAT SERPL-MCNC: 0.67 MG/DL — SIGNIFICANT CHANGE UP (ref 0.5–1.3)
CRP SERPL-MCNC: 13.04 MG/DL — HIGH (ref 0–0.4)
GLUCOSE BLDC GLUCOMTR-MCNC: 188 MG/DL — HIGH (ref 70–99)
GLUCOSE BLDC GLUCOMTR-MCNC: 200 MG/DL — HIGH (ref 70–99)
GLUCOSE BLDC GLUCOMTR-MCNC: 217 MG/DL — HIGH (ref 70–99)
GLUCOSE BLDC GLUCOMTR-MCNC: 246 MG/DL — HIGH (ref 70–99)
GLUCOSE SERPL-MCNC: 221 MG/DL — HIGH (ref 70–99)
HCT VFR BLD CALC: 34.9 % — LOW (ref 39–50)
HGB BLD-MCNC: 10.9 G/DL — LOW (ref 13–17)
MAGNESIUM SERPL-MCNC: 2.2 MG/DL — SIGNIFICANT CHANGE UP (ref 1.6–2.6)
MCHC RBC-ENTMCNC: 19.9 PG — LOW (ref 27–34)
MCHC RBC-ENTMCNC: 31.2 GM/DL — LOW (ref 32–36)
MCV RBC AUTO: 63.8 FL — LOW (ref 80–100)
NRBC # BLD: 0 /100 WBCS — SIGNIFICANT CHANGE UP (ref 0–0)
PHOSPHATE SERPL-MCNC: 3 MG/DL — SIGNIFICANT CHANGE UP (ref 2.5–4.5)
PLATELET # BLD AUTO: 415 K/UL — HIGH (ref 150–400)
POTASSIUM SERPL-MCNC: 3.6 MMOL/L — SIGNIFICANT CHANGE UP (ref 3.5–5.3)
POTASSIUM SERPL-SCNC: 3.6 MMOL/L — SIGNIFICANT CHANGE UP (ref 3.5–5.3)
PROCALCITONIN SERPL-MCNC: 0.23 NG/ML — HIGH (ref 0.02–0.1)
PROT SERPL-MCNC: 6 G/DL — SIGNIFICANT CHANGE UP (ref 6–8.3)
RBC # BLD: 5.47 M/UL — SIGNIFICANT CHANGE UP (ref 4.2–5.8)
RBC # FLD: 15.1 % — HIGH (ref 10.3–14.5)
SODIUM SERPL-SCNC: 136 MMOL/L — SIGNIFICANT CHANGE UP (ref 135–145)
WBC # BLD: 8.76 K/UL — SIGNIFICANT CHANGE UP (ref 3.8–10.5)
WBC # FLD AUTO: 8.76 K/UL — SIGNIFICANT CHANGE UP (ref 3.8–10.5)

## 2020-04-05 PROCEDURE — 71045 X-RAY EXAM CHEST 1 VIEW: CPT | Mod: 26

## 2020-04-05 PROCEDURE — 99233 SBSQ HOSP IP/OBS HIGH 50: CPT | Mod: GC

## 2020-04-05 RX ORDER — DEXTROSE 50 % IN WATER 50 %
25 SYRINGE (ML) INTRAVENOUS ONCE
Refills: 0 | Status: DISCONTINUED | OUTPATIENT
Start: 2020-04-05 | End: 2020-04-14

## 2020-04-05 RX ORDER — SODIUM CHLORIDE 9 MG/ML
1000 INJECTION, SOLUTION INTRAVENOUS
Refills: 0 | Status: DISCONTINUED | OUTPATIENT
Start: 2020-04-05 | End: 2020-04-14

## 2020-04-05 RX ORDER — FUROSEMIDE 40 MG
10 TABLET ORAL ONCE
Refills: 0 | Status: COMPLETED | OUTPATIENT
Start: 2020-04-05 | End: 2020-04-05

## 2020-04-05 RX ORDER — DEXTROSE 50 % IN WATER 50 %
15 SYRINGE (ML) INTRAVENOUS ONCE
Refills: 0 | Status: DISCONTINUED | OUTPATIENT
Start: 2020-04-05 | End: 2020-04-14

## 2020-04-05 RX ORDER — INSULIN LISPRO 100/ML
VIAL (ML) SUBCUTANEOUS
Refills: 0 | Status: DISCONTINUED | OUTPATIENT
Start: 2020-04-05 | End: 2020-04-08

## 2020-04-05 RX ORDER — INSULIN LISPRO 100/ML
2 VIAL (ML) SUBCUTANEOUS ONCE
Refills: 0 | Status: COMPLETED | OUTPATIENT
Start: 2020-04-05 | End: 2020-04-05

## 2020-04-05 RX ORDER — INSULIN LISPRO 100/ML
VIAL (ML) SUBCUTANEOUS AT BEDTIME
Refills: 0 | Status: DISCONTINUED | OUTPATIENT
Start: 2020-04-05 | End: 2020-04-08

## 2020-04-05 RX ORDER — INSULIN LISPRO 100/ML
2 VIAL (ML) SUBCUTANEOUS
Refills: 0 | Status: DISCONTINUED | OUTPATIENT
Start: 2020-04-05 | End: 2020-04-06

## 2020-04-05 RX ORDER — ANAKINRA 100MG/0.67
100 SYRINGE (ML) SUBCUTANEOUS EVERY 6 HOURS
Refills: 0 | Status: COMPLETED | OUTPATIENT
Start: 2020-04-05 | End: 2020-04-08

## 2020-04-05 RX ORDER — DEXTROSE 50 % IN WATER 50 %
12.5 SYRINGE (ML) INTRAVENOUS ONCE
Refills: 0 | Status: DISCONTINUED | OUTPATIENT
Start: 2020-04-05 | End: 2020-04-14

## 2020-04-05 RX ORDER — INSULIN GLARGINE 100 [IU]/ML
12 INJECTION, SOLUTION SUBCUTANEOUS AT BEDTIME
Refills: 0 | Status: DISCONTINUED | OUTPATIENT
Start: 2020-04-05 | End: 2020-04-06

## 2020-04-05 RX ORDER — GLUCAGON INJECTION, SOLUTION 0.5 MG/.1ML
1 INJECTION, SOLUTION SUBCUTANEOUS ONCE
Refills: 0 | Status: DISCONTINUED | OUTPATIENT
Start: 2020-04-05 | End: 2020-04-14

## 2020-04-05 RX ORDER — POLYETHYLENE GLYCOL 3350 17 G/17G
17 POWDER, FOR SOLUTION ORAL
Refills: 0 | Status: DISCONTINUED | OUTPATIENT
Start: 2020-04-05 | End: 2020-04-14

## 2020-04-05 RX ADMIN — Medication 10 MILLIGRAM(S): at 13:01

## 2020-04-05 RX ADMIN — POLYETHYLENE GLYCOL 3350 17 GRAM(S): 17 POWDER, FOR SOLUTION ORAL at 18:15

## 2020-04-05 RX ADMIN — Medication 2: at 08:47

## 2020-04-05 RX ADMIN — Medication 1500 MILLIGRAM(S): at 12:59

## 2020-04-05 RX ADMIN — INSULIN GLARGINE 12 UNIT(S): 100 INJECTION, SOLUTION SUBCUTANEOUS at 21:30

## 2020-04-05 RX ADMIN — Medication 2 UNIT(S): at 18:14

## 2020-04-05 RX ADMIN — Medication 100 MILLIGRAM(S): at 18:16

## 2020-04-05 RX ADMIN — Medication 10 MILLILITER(S): at 21:30

## 2020-04-05 RX ADMIN — Medication 2 UNIT(S): at 18:13

## 2020-04-05 RX ADMIN — Medication 5 MILLIGRAM(S): at 18:16

## 2020-04-05 RX ADMIN — ENOXAPARIN SODIUM 40 MILLIGRAM(S): 100 INJECTION SUBCUTANEOUS at 12:59

## 2020-04-05 RX ADMIN — Medication 1: at 13:00

## 2020-04-05 NOTE — PROGRESS NOTE ADULT - PROBLEM SELECTOR PLAN 2
- COVID POSITIVE  - D/kyle plaquenil as Qtc 500.   - escalated to NRB from 5L NC du to hypoxia to 85%  - dc'd anakinra 4/4, may consider restarting as patient now requiring more oxygen - COVID POSITIVE  - D/kyle plaquenil as Qtc 500.   - escalated to NRB from 5L NC du to hypoxia to 85%  - Trial of Anakinra given increased o2 requirements to NRB.

## 2020-04-05 NOTE — PROGRESS NOTE ADULT - ATTENDING COMMENTS
Patient seen and examined today. I agree with the above findings, assessment, and plan with the following additions and exceptions:     Hold Plaquenil as QTc is 500.   Trial of Anakinra given increased o2 requirements to NRB.   Low suspicion for bacterial process. Procalcitonin downtrending.   Will trial lasix 10 mg ivp.   Monitor inflammatory markers.    Encouraging awake proning and lateral decubitus positioning as tolerated.   Incentive spirometer and OOBTC as tolerated.  Monitor hemodynamics closely.  Adjust insulin based on SSI requirements today.    Patient is mechanical fall. No clear trauma on exam. No neurologic deficits on exam. CT head noncontrast without acute process.   Rest of plan as above    Dr. Tremaine Williamson DO  Attending Physician  Division of Hospital Medicine  [facility]  Pager:  [phone]

## 2020-04-05 NOTE — PROGRESS NOTE ADULT - SUBJECTIVE AND OBJECTIVE BOX
Patient is a 64y old  Male who presents with a chief complaint of SOB (04 Apr 2020 07:09)      SUBJECTIVE / OVERNIGHT EVENTS: Patient febrile to 101 overnight was given tylenol. Patient with increased O2 requirements overnight, advanced from 5L NC to NRB due to hypoxia to 86%.     MEDICATIONS  (STANDING):  ascorbic acid 1500 milliGRAM(s) Oral daily  dextrose 5%. 1000 milliLiter(s) (50 mL/Hr) IV Continuous <Continuous>  dextrose 50% Injectable 12.5 Gram(s) IV Push once  dextrose 50% Injectable 25 Gram(s) IV Push once  dextrose 50% Injectable 25 Gram(s) IV Push once  enoxaparin Injectable 40 milliGRAM(s) SubCutaneous daily  influenza   Vaccine 0.5 milliLiter(s) IntraMuscular once  insulin glargine Injectable (LANTUS) 13 Unit(s) SubCutaneous <User Schedule>  insulin lispro (HumaLOG) corrective regimen sliding scale   SubCutaneous three times a day before meals  polyethylene glycol 3350 17 Gram(s) Oral at bedtime  senna 2 Tablet(s) Oral at bedtime  zinc sulfate 220 milliGRAM(s) Oral daily    MEDICATIONS  (PRN):  acetaminophen   Tablet .. 650 milliGRAM(s) Oral every 4 hours PRN Temp greater or equal to 38.5C (101.3F)  dextrose 40% Gel 15 Gram(s) Oral once PRN Blood Glucose LESS THAN 70 milliGRAM(s)/deciliter  guaifenesin/dextromethorphan  Syrup 10 milliLiter(s) Oral every 6 hours PRN Cough      Vital Signs Last 24 Hrs  T(C): 36.8 (05 Apr 2020 05:17), Max: 38.3 (04 Apr 2020 22:20)  T(F): 98.2 (05 Apr 2020 05:17), Max: 101 (04 Apr 2020 22:20)  HR: 86 (05 Apr 2020 05:17) (78 - 86)  BP: 110/72 (05 Apr 2020 05:17) (96/67 - 110/72)  BP(mean): --  RR: 22 (05 Apr 2020 05:31) (19 - 22)  SpO2: 98% (05 Apr 2020 05:31) (86% - 98%)  CAPILLARY BLOOD GLUCOSE      POCT Blood Glucose.: 188 mg/dL (05 Apr 2020 07:50)  POCT Blood Glucose.: 217 mg/dL (04 Apr 2020 17:53)  POCT Blood Glucose.: 200 mg/dL (04 Apr 2020 12:06)  POCT Blood Glucose.: 227 mg/dL (04 Apr 2020 09:06)    I&O's Summary    04 Apr 2020 07:01  -  05 Apr 2020 07:00  --------------------------------------------------------  IN: 480 mL / OUT: 0 mL / NET: 480 mL        PHYSICAL EXAM:  GENERAL: On NRB mask   HEAD:  Atraumatic, Normocephalic  EYES: conjunctiva and sclera clear  NECK: Supple  CHEST/LUNG: Clear to auscultation bilaterally; No wheeze  HEART: Regular rate and rhythm; No murmurs, rubs, or gallops  ABDOMEN: Soft, Nontender, Nondistended; Bowel sounds present  EXTREMITIES:  2+ Peripheral Pulses, No clubbing, cyanosis, or edema  PSYCH: AAOx3      LABS:                        11.0   9.43  )-----------( 361      ( 04 Apr 2020 07:13 )             34.6     04-05    136  |  97  |  12  ----------------------------<  221<H>  3.6   |  25  |  0.67    Ca    7.8<L>      05 Apr 2020 07:13  Phos  3.0     04-05  Mg     2.2     04-05    TPro  6.0  /  Alb  2.4<L>  /  TBili  0.6  /  DBili  x   /  AST  37  /  ALT  69<H>  /  AlkPhos  201<H>  04-05                  RADIOLOGY & ADDITIONAL TESTS:    Imaging Personally Reviewed:    Consultant(s) Notes Reviewed:      Care Discussed with Consultants/Other Providers: Patient is a 64y old  Male who presents with a chief complaint of SOB (04 Apr 2020 07:09)      SUBJECTIVE / OVERNIGHT EVENTS: Patient febrile to 101 overnight was given tylenol. Patient with increased O2 requirements overnight, advanced from 5L NC to NRB due to hypoxia to 86%.   Reports mild dyspnea.  Reports that he wants to have a BM as he hasn't had one in a few days.   Passing gas.     MEDICATIONS  (STANDING):  ascorbic acid 1500 milliGRAM(s) Oral daily  dextrose 5%. 1000 milliLiter(s) (50 mL/Hr) IV Continuous <Continuous>  dextrose 50% Injectable 12.5 Gram(s) IV Push once  dextrose 50% Injectable 25 Gram(s) IV Push once  dextrose 50% Injectable 25 Gram(s) IV Push once  enoxaparin Injectable 40 milliGRAM(s) SubCutaneous daily  influenza   Vaccine 0.5 milliLiter(s) IntraMuscular once  insulin glargine Injectable (LANTUS) 13 Unit(s) SubCutaneous <User Schedule>  insulin lispro (HumaLOG) corrective regimen sliding scale   SubCutaneous three times a day before meals  polyethylene glycol 3350 17 Gram(s) Oral at bedtime  senna 2 Tablet(s) Oral at bedtime  zinc sulfate 220 milliGRAM(s) Oral daily    MEDICATIONS  (PRN):  acetaminophen   Tablet .. 650 milliGRAM(s) Oral every 4 hours PRN Temp greater or equal to 38.5C (101.3F)  dextrose 40% Gel 15 Gram(s) Oral once PRN Blood Glucose LESS THAN 70 milliGRAM(s)/deciliter  guaifenesin/dextromethorphan  Syrup 10 milliLiter(s) Oral every 6 hours PRN Cough      Vital Signs Last 24 Hrs  T(C): 36.8 (05 Apr 2020 05:17), Max: 38.3 (04 Apr 2020 22:20)  T(F): 98.2 (05 Apr 2020 05:17), Max: 101 (04 Apr 2020 22:20)  HR: 86 (05 Apr 2020 05:17) (78 - 86)  BP: 110/72 (05 Apr 2020 05:17) (96/67 - 110/72)  BP(mean): --  RR: 22 (05 Apr 2020 05:31) (19 - 22)  SpO2: 98% (05 Apr 2020 05:31) (86% - 98%)  CAPILLARY BLOOD GLUCOSE      POCT Blood Glucose.: 188 mg/dL (05 Apr 2020 07:50)  POCT Blood Glucose.: 217 mg/dL (04 Apr 2020 17:53)  POCT Blood Glucose.: 200 mg/dL (04 Apr 2020 12:06)  POCT Blood Glucose.: 227 mg/dL (04 Apr 2020 09:06)    I&O's Summary    04 Apr 2020 07:01  -  05 Apr 2020 07:00  --------------------------------------------------------  IN: 480 mL / OUT: 0 mL / NET: 480 mL        PHYSICAL EXAM:  GENERAL: On NRB mask   HEAD:  Atraumatic, Normocephalic  EYES: conjunctiva and sclera clear  NECK: Supple  CHEST/LUNG: Clear to auscultation bilaterally; No wheeze  HEART: Regular rate and rhythm; No murmurs, rubs, or gallops  ABDOMEN: Soft, Nontender, Nondistended; Bowel sounds present  EXTREMITIES:  2+ Peripheral Pulses, No clubbing, cyanosis, or edema  PSYCH: AAOx3      LABS:                        11.0   9.43  )-----------( 361      ( 04 Apr 2020 07:13 )             34.6     04-05    136  |  97  |  12  ----------------------------<  221<H>  3.6   |  25  |  0.67    Ca    7.8<L>      05 Apr 2020 07:13  Phos  3.0     04-05  Mg     2.2     04-05    TPro  6.0  /  Alb  2.4<L>  /  TBili  0.6  /  DBili  x   /  AST  37  /  ALT  69<H>  /  AlkPhos  201<H>  04-05                  RADIOLOGY & ADDITIONAL TESTS:    Imaging Personally Reviewed:    Consultant(s) Notes Reviewed:      Care Discussed with Consultants/Other Providers:

## 2020-04-05 NOTE — PROGRESS NOTE ADULT - ASSESSMENT
65yo male with hx of DM II, presented to the ED with complaints of cough, myalgias, fever and progressively worsening sob for the past several days, noted to have acute respiratory failure with hypoxia likely due to viral Pneumonia 2/2 to COVID

## 2020-04-05 NOTE — PROGRESS NOTE ADULT - PROBLEM SELECTOR PLAN 1
- Viral PNA 2/2 COVID Positive   - CXR with Bilateral Patchy Opacities  - patient now on NRB due to hypoxia to 86% overnight, may obtain repeat CXR to assess progression - Viral PNA 2/2 COVID Positive   - CXR with Bilateral Patchy Opacities  - patient now on NRB due to hypoxia to 86% overnight   - Repeat CXR.

## 2020-04-05 NOTE — PROVIDER CONTACT NOTE (OTHER) - SITUATION
Pt's oxygen saturation was 86% at 5LNC, when increased to 10LNC, was 88%, nonrebreather was applied and O2 was 98%.

## 2020-04-06 LAB
ALBUMIN SERPL ELPH-MCNC: 2.3 G/DL — LOW (ref 3.3–5)
ALP SERPL-CCNC: 212 U/L — HIGH (ref 40–120)
ALT FLD-CCNC: 66 U/L — HIGH (ref 10–45)
ANION GAP SERPL CALC-SCNC: 10 MMOL/L — SIGNIFICANT CHANGE UP (ref 5–17)
ANISOCYTOSIS BLD QL: SLIGHT — SIGNIFICANT CHANGE UP
AST SERPL-CCNC: 62 U/L — HIGH (ref 10–40)
BASOPHILS # BLD AUTO: 0 K/UL — SIGNIFICANT CHANGE UP (ref 0–0.2)
BASOPHILS NFR BLD AUTO: 0 % — SIGNIFICANT CHANGE UP (ref 0–2)
BILIRUB SERPL-MCNC: 0.5 MG/DL — SIGNIFICANT CHANGE UP (ref 0.2–1.2)
BUN SERPL-MCNC: 12 MG/DL — SIGNIFICANT CHANGE UP (ref 7–23)
CALCIUM SERPL-MCNC: 7.7 MG/DL — LOW (ref 8.4–10.5)
CHLORIDE SERPL-SCNC: 94 MMOL/L — LOW (ref 96–108)
CO2 SERPL-SCNC: 29 MMOL/L — SIGNIFICANT CHANGE UP (ref 22–31)
CREAT SERPL-MCNC: 0.63 MG/DL — SIGNIFICANT CHANGE UP (ref 0.5–1.3)
CRP SERPL-MCNC: 11.9 MG/DL — HIGH (ref 0–0.4)
D DIMER BLD IA.RAPID-MCNC: 280 NG/ML DDU — HIGH
ELLIPTOCYTES BLD QL SMEAR: SIGNIFICANT CHANGE UP
EOSINOPHIL # BLD AUTO: 0.07 K/UL — SIGNIFICANT CHANGE UP (ref 0–0.5)
EOSINOPHIL NFR BLD AUTO: 0.9 % — SIGNIFICANT CHANGE UP (ref 0–6)
GIANT PLATELETS BLD QL SMEAR: PRESENT — SIGNIFICANT CHANGE UP
GLUCOSE BLDC GLUCOMTR-MCNC: 243 MG/DL — HIGH (ref 70–99)
GLUCOSE BLDC GLUCOMTR-MCNC: 247 MG/DL — HIGH (ref 70–99)
GLUCOSE BLDC GLUCOMTR-MCNC: 297 MG/DL — HIGH (ref 70–99)
GLUCOSE BLDC GLUCOMTR-MCNC: 308 MG/DL — HIGH (ref 70–99)
GLUCOSE SERPL-MCNC: 212 MG/DL — HIGH (ref 70–99)
HCT VFR BLD CALC: 32.8 % — LOW (ref 39–50)
HGB BLD-MCNC: 10.4 G/DL — LOW (ref 13–17)
LYMPHOCYTES # BLD AUTO: 0.41 K/UL — LOW (ref 1–3.3)
LYMPHOCYTES # BLD AUTO: 5.3 % — LOW (ref 13–44)
MAGNESIUM SERPL-MCNC: 2.1 MG/DL — SIGNIFICANT CHANGE UP (ref 1.6–2.6)
MANUAL SMEAR VERIFICATION: SIGNIFICANT CHANGE UP
MCHC RBC-ENTMCNC: 19.9 PG — LOW (ref 27–34)
MCHC RBC-ENTMCNC: 31.7 GM/DL — LOW (ref 32–36)
MCV RBC AUTO: 62.8 FL — LOW (ref 80–100)
MICROCYTES BLD QL: SLIGHT — SIGNIFICANT CHANGE UP
MONOCYTES # BLD AUTO: 0.55 K/UL — SIGNIFICANT CHANGE UP (ref 0–0.9)
MONOCYTES NFR BLD AUTO: 7.1 % — SIGNIFICANT CHANGE UP (ref 2–14)
NEUTROPHILS # BLD AUTO: 6.74 K/UL — SIGNIFICANT CHANGE UP (ref 1.8–7.4)
NEUTROPHILS NFR BLD AUTO: 85.8 % — HIGH (ref 43–77)
NEUTS BAND # BLD: 0.9 % — SIGNIFICANT CHANGE UP (ref 0–8)
OVALOCYTES BLD QL SMEAR: SLIGHT — SIGNIFICANT CHANGE UP
PHOSPHATE SERPL-MCNC: 3.3 MG/DL — SIGNIFICANT CHANGE UP (ref 2.5–4.5)
PLAT MORPH BLD: NORMAL — SIGNIFICANT CHANGE UP
PLATELET # BLD AUTO: 472 K/UL — HIGH (ref 150–400)
POIKILOCYTOSIS BLD QL AUTO: SIGNIFICANT CHANGE UP
POTASSIUM SERPL-MCNC: 3.4 MMOL/L — LOW (ref 3.5–5.3)
POTASSIUM SERPL-SCNC: 3.4 MMOL/L — LOW (ref 3.5–5.3)
PROT SERPL-MCNC: 5.9 G/DL — LOW (ref 6–8.3)
RBC # BLD: 5.22 M/UL — SIGNIFICANT CHANGE UP (ref 4.2–5.8)
RBC # FLD: 15.2 % — HIGH (ref 10.3–14.5)
RBC BLD AUTO: ABNORMAL
SODIUM SERPL-SCNC: 133 MMOL/L — LOW (ref 135–145)
TARGETS BLD QL SMEAR: SLIGHT — SIGNIFICANT CHANGE UP
WBC # BLD: 7.77 K/UL — SIGNIFICANT CHANGE UP (ref 3.8–10.5)
WBC # FLD AUTO: 7.77 K/UL — SIGNIFICANT CHANGE UP (ref 3.8–10.5)

## 2020-04-06 PROCEDURE — 99233 SBSQ HOSP IP/OBS HIGH 50: CPT

## 2020-04-06 RX ORDER — INSULIN GLARGINE 100 [IU]/ML
14 INJECTION, SOLUTION SUBCUTANEOUS AT BEDTIME
Refills: 0 | Status: DISCONTINUED | OUTPATIENT
Start: 2020-04-06 | End: 2020-04-07

## 2020-04-06 RX ORDER — SODIUM CHLORIDE 9 MG/ML
250 INJECTION INTRAMUSCULAR; INTRAVENOUS; SUBCUTANEOUS ONCE
Refills: 0 | Status: COMPLETED | OUTPATIENT
Start: 2020-04-06 | End: 2020-04-06

## 2020-04-06 RX ORDER — POTASSIUM CHLORIDE 20 MEQ
20 PACKET (EA) ORAL ONCE
Refills: 0 | Status: COMPLETED | OUTPATIENT
Start: 2020-04-06 | End: 2020-04-06

## 2020-04-06 RX ORDER — INSULIN LISPRO 100/ML
3 VIAL (ML) SUBCUTANEOUS
Refills: 0 | Status: DISCONTINUED | OUTPATIENT
Start: 2020-04-06 | End: 2020-04-07

## 2020-04-06 RX ADMIN — Medication 100 MILLIGRAM(S): at 05:44

## 2020-04-06 RX ADMIN — Medication 100 MILLIGRAM(S): at 18:18

## 2020-04-06 RX ADMIN — Medication 2 UNIT(S): at 08:57

## 2020-04-06 RX ADMIN — Medication 100 MILLIGRAM(S): at 00:22

## 2020-04-06 RX ADMIN — SENNA PLUS 2 TABLET(S): 8.6 TABLET ORAL at 21:56

## 2020-04-06 RX ADMIN — Medication 10 MILLILITER(S): at 18:18

## 2020-04-06 RX ADMIN — Medication 100 MILLIGRAM(S): at 22:00

## 2020-04-06 RX ADMIN — SODIUM CHLORIDE 500 MILLILITER(S): 9 INJECTION INTRAMUSCULAR; INTRAVENOUS; SUBCUTANEOUS at 22:10

## 2020-04-06 RX ADMIN — Medication 1500 MILLIGRAM(S): at 12:59

## 2020-04-06 RX ADMIN — Medication 2: at 12:59

## 2020-04-06 RX ADMIN — Medication 100 MILLIGRAM(S): at 12:59

## 2020-04-06 RX ADMIN — Medication 3: at 18:17

## 2020-04-06 RX ADMIN — Medication 3 UNIT(S): at 18:17

## 2020-04-06 RX ADMIN — INSULIN GLARGINE 14 UNIT(S): 100 INJECTION, SOLUTION SUBCUTANEOUS at 21:54

## 2020-04-06 RX ADMIN — ENOXAPARIN SODIUM 40 MILLIGRAM(S): 100 INJECTION SUBCUTANEOUS at 12:59

## 2020-04-06 RX ADMIN — Medication 5 MILLIGRAM(S): at 18:18

## 2020-04-06 RX ADMIN — Medication 2: at 08:57

## 2020-04-06 RX ADMIN — Medication 10 MILLILITER(S): at 08:57

## 2020-04-06 RX ADMIN — Medication 2: at 21:55

## 2020-04-06 RX ADMIN — Medication 2 UNIT(S): at 12:59

## 2020-04-06 RX ADMIN — Medication 5 MILLIGRAM(S): at 05:45

## 2020-04-06 RX ADMIN — Medication 20 MILLIEQUIVALENT(S): at 13:04

## 2020-04-06 RX ADMIN — Medication 100 MILLIGRAM(S): at 13:01

## 2020-04-06 NOTE — CHART NOTE - NSCHARTNOTEFT_GEN_A_CORE
This AM at approximately 8:30AM, patient was found down in bathroom with soiled garments. Patient was found on his left side on the bathroom floor. Upon questioning, patient denied any syncope, dizziness, chest pain or heart palpitations. He denied any leg weakness. Patient with urinal within reach but stated that he felt "more comfortable" going to the bathroom physically. On Physical exam, patient is alert and oriented x4. Patient with no focal neurologic deficits and 5/5 strength and sensation in both UE and LE bilaterally. No hematoma or bruises noted on head or on skin on examination. POC glucose was 230. Patient was satting 93% on 5L NC. JOSÉ MIGUEL 103/53.     Given patient noting head injury during fall, will order CT head to evaluate for any occult bleed s/p fall. Otherwise patient was hemodynamically stable.         Rudolph Garcia  Internal Medicine Resident, PGY1
COVID Patient, off hydroxychloroquine since 4/3/2020    QTc measured on: 4/4/2020  QTc measurement: 471ms    No further monitoring or serial ECGs required as per protocol. D/C Biotel.

## 2020-04-06 NOTE — PROGRESS NOTE ADULT - PROBLEM SELECTOR PLAN 2
- COVID POSITIVE  - D/kyle plaquenil as Qtc 500.   - escalated to NRB from 5L NC due to hypoxia to 85%  - Trial of Anakinra given increased o2 requirements to NRB, on day 2/3 - COVID POSITIVE  - D/kyle plaquenil as Qtc 500.   - escalated to NRB from 5L NC due to hypoxia to 85%  - c/w anakinra given increased o2 requirements to NRB, on day 2/3, will assess   - Encourage patient to prone and lay on side, OOBTC with assistance, incentive spirometry

## 2020-04-06 NOTE — PROGRESS NOTE ADULT - PROBLEM SELECTOR PLAN 1
- Viral PNA 2/2 COVID Positive   - CXR with Bilateral Patchy Opacities  - patient now on NRB due to hypoxia to 86% overnight   - Repeat CXR showed some sign of edema within the right fissure, 10 mg lasix IVP x1 - Viral PNA 2/2 COVID Positive   - CXR with Bilateral Patchy Opacities  - patient now on NRB due to hypoxia to 86% overnight   - Repeat CXR showed some sign of edema within the right fissure, s/p 10 mg lasix IVP x1  - Encourage to proning, OOBTC with assistance.

## 2020-04-06 NOTE — PROGRESS NOTE ADULT - SUBJECTIVE AND OBJECTIVE BOX
Patient is a 64y old  Male who presents with a chief complaint of SOB (05 Apr 2020 08:23)      SUBJECTIVE / OVERNIGHT EVENTS: No acute events overnight. Patient down to 5L on NRB.     MEDICATIONS  (STANDING):  anakinra Injectable 100 milliGRAM(s) SubCutaneous every 6 hours  ascorbic acid 1500 milliGRAM(s) Oral daily  bisacodyl 5 milliGRAM(s) Oral every 12 hours  dextrose 5%. 1000 milliLiter(s) (50 mL/Hr) IV Continuous <Continuous>  dextrose 50% Injectable 12.5 Gram(s) IV Push once  dextrose 50% Injectable 25 Gram(s) IV Push once  dextrose 50% Injectable 25 Gram(s) IV Push once  enoxaparin Injectable 40 milliGRAM(s) SubCutaneous daily  influenza   Vaccine 0.5 milliLiter(s) IntraMuscular once  insulin glargine Injectable (LANTUS) 12 Unit(s) SubCutaneous at bedtime  insulin lispro (HumaLOG) corrective regimen sliding scale   SubCutaneous three times a day before meals  insulin lispro (HumaLOG) corrective regimen sliding scale   SubCutaneous at bedtime  insulin lispro Injectable (HumaLOG) 2 Unit(s) SubCutaneous three times a day before meals  polyethylene glycol 3350 17 Gram(s) Oral two times a day  senna 2 Tablet(s) Oral at bedtime  zinc sulfate 220 milliGRAM(s) Oral daily    MEDICATIONS  (PRN):  acetaminophen   Tablet .. 650 milliGRAM(s) Oral every 4 hours PRN Temp greater or equal to 38.5C (101.3F)  dextrose 40% Gel 15 Gram(s) Oral once PRN Blood Glucose LESS THAN 70 milliGRAM(s)/deciliter  glucagon  Injectable 1 milliGRAM(s) IntraMuscular once PRN Glucose LESS THAN 70 milligrams/deciliter  guaifenesin/dextromethorphan  Syrup 10 milliLiter(s) Oral every 6 hours PRN Cough      Vital Signs Last 24 Hrs  T(C): 36.7 (06 Apr 2020 05:50), Max: 37.6 (05 Apr 2020 08:52)  T(F): 98 (06 Apr 2020 05:50), Max: 99.7 (05 Apr 2020 08:52)  HR: 78 (06 Apr 2020 06:03) (78 - 97)  BP: 106/68 (06 Apr 2020 05:50) (100/64 - 112/67)  BP(mean): --  RR: 20 (06 Apr 2020 06:03) (20 - 22)  SpO2: 94% (06 Apr 2020 06:03) (91% - 94%)  CAPILLARY BLOOD GLUCOSE      POCT Blood Glucose.: 217 mg/dL (05 Apr 2020 21:04)  POCT Blood Glucose.: 246 mg/dL (05 Apr 2020 17:13)  POCT Blood Glucose.: 200 mg/dL (05 Apr 2020 12:26)    I&O's Summary      PHYSICAL EXAM:  GENERAL: NAD, well-developed  HEAD:  Atraumatic, Normocephalic  EYES: EOMI, PERRLA, conjunctiva and sclera clear  NECK: Supple, No JVD  CHEST/LUNG: Clear to auscultation bilaterally; No wheeze  HEART: Regular rate and rhythm; No murmurs, rubs, or gallops  ABDOMEN: Soft, Nontender, Nondistended; Bowel sounds present  EXTREMITIES:  2+ Peripheral Pulses, No clubbing, cyanosis, or edema  PSYCH: AAOx3  NEUROLOGY: non-focal  SKIN: No rashes or lesions    LABS:                        10.9   8.76  )-----------( 415      ( 05 Apr 2020 07:17 )             34.9     04-05    136  |  97  |  12  ----------------------------<  221<H>  3.6   |  25  |  0.67    Ca    7.8<L>      05 Apr 2020 07:13  Phos  3.0     04-05  Mg     2.2     04-05    TPro  6.0  /  Alb  2.4<L>  /  TBili  0.6  /  DBili  x   /  AST  37  /  ALT  69<H>  /  AlkPhos  201<H>  04-05                  RADIOLOGY & ADDITIONAL TESTS:    Imaging Personally Reviewed:    Consultant(s) Notes Reviewed:      Care Discussed with Consultants/Other Providers: Patient is a 64y old  Male who presents with a chief complaint of SOB (05 Apr 2020 08:23)      SUBJECTIVE / OVERNIGHT EVENTS: No acute events overnight. Patient down to 5L on NRB. patient staing that he is weak with decreased appetite.     MEDICATIONS  (STANDING):  anakinra Injectable 100 milliGRAM(s) SubCutaneous every 6 hours  ascorbic acid 1500 milliGRAM(s) Oral daily  bisacodyl 5 milliGRAM(s) Oral every 12 hours  dextrose 5%. 1000 milliLiter(s) (50 mL/Hr) IV Continuous <Continuous>  dextrose 50% Injectable 12.5 Gram(s) IV Push once  dextrose 50% Injectable 25 Gram(s) IV Push once  dextrose 50% Injectable 25 Gram(s) IV Push once  enoxaparin Injectable 40 milliGRAM(s) SubCutaneous daily  influenza   Vaccine 0.5 milliLiter(s) IntraMuscular once  insulin glargine Injectable (LANTUS) 12 Unit(s) SubCutaneous at bedtime  insulin lispro (HumaLOG) corrective regimen sliding scale   SubCutaneous three times a day before meals  insulin lispro (HumaLOG) corrective regimen sliding scale   SubCutaneous at bedtime  insulin lispro Injectable (HumaLOG) 2 Unit(s) SubCutaneous three times a day before meals  polyethylene glycol 3350 17 Gram(s) Oral two times a day  senna 2 Tablet(s) Oral at bedtime  zinc sulfate 220 milliGRAM(s) Oral daily    MEDICATIONS  (PRN):  acetaminophen   Tablet .. 650 milliGRAM(s) Oral every 4 hours PRN Temp greater or equal to 38.5C (101.3F)  dextrose 40% Gel 15 Gram(s) Oral once PRN Blood Glucose LESS THAN 70 milliGRAM(s)/deciliter  glucagon  Injectable 1 milliGRAM(s) IntraMuscular once PRN Glucose LESS THAN 70 milligrams/deciliter  guaifenesin/dextromethorphan  Syrup 10 milliLiter(s) Oral every 6 hours PRN Cough      Vital Signs Last 24 Hrs  T(C): 36.7 (06 Apr 2020 05:50), Max: 37.6 (05 Apr 2020 08:52)  T(F): 98 (06 Apr 2020 05:50), Max: 99.7 (05 Apr 2020 08:52)  HR: 78 (06 Apr 2020 06:03) (78 - 97)  BP: 106/68 (06 Apr 2020 05:50) (100/64 - 112/67)  BP(mean): --  RR: 20 (06 Apr 2020 06:03) (20 - 22)  SpO2: 94% (06 Apr 2020 06:03) (91% - 94%)  CAPILLARY BLOOD GLUCOSE      POCT Blood Glucose.: 217 mg/dL (05 Apr 2020 21:04)  POCT Blood Glucose.: 246 mg/dL (05 Apr 2020 17:13)  POCT Blood Glucose.: 200 mg/dL (05 Apr 2020 12:26)    I&O's Summary      PHYSICAL EXAM:  GENERAL: ON NRB, appears fatigued   HEAD:  Atraumatic, Normocephalic  EYES: conjunctiva and sclera clear  CHEST/LUNG: Clear to auscultation bilaterally; No wheeze  HEART: Regular rate and rhythm; No murmurs, rubs, or gallops  ABDOMEN: Soft, Nontender, Nondistended; Bowel sounds present  EXTREMITIES:  2+ Peripheral Pulses, No edema  PSYCH: AAOx3      LABS:                        10.9   8.76  )-----------( 415      ( 05 Apr 2020 07:17 )             34.9     04-05    136  |  97  |  12  ----------------------------<  221<H>  3.6   |  25  |  0.67    Ca    7.8<L>      05 Apr 2020 07:13  Phos  3.0     04-05  Mg     2.2     04-05    TPro  6.0  /  Alb  2.4<L>  /  TBili  0.6  /  DBili  x   /  AST  37  /  ALT  69<H>  /  AlkPhos  201<H>  04-05                  RADIOLOGY & ADDITIONAL TESTS:    Imaging Personally Reviewed:    Consultant(s) Notes Reviewed:      Care Discussed with Consultants/Other Providers:

## 2020-04-06 NOTE — PROGRESS NOTE ADULT - PROBLEM SELECTOR PLAN 3
- Fingersticks elevated to >200s   - 12 U Lantus at bedtime and 2U with meals   - will adjust as necessary - Fingersticks elevated to >200s   - Increased to 14 U Lantus and 3 U Lispro   - will continue to monitor and adjust as necessary

## 2020-04-07 LAB
ALBUMIN SERPL ELPH-MCNC: 2.3 G/DL — LOW (ref 3.3–5)
ALP SERPL-CCNC: 205 U/L — HIGH (ref 40–120)
ALT FLD-CCNC: 80 U/L — HIGH (ref 10–45)
ANION GAP SERPL CALC-SCNC: 10 MMOL/L — SIGNIFICANT CHANGE UP (ref 5–17)
AST SERPL-CCNC: 90 U/L — HIGH (ref 10–40)
BILIRUB SERPL-MCNC: 0.4 MG/DL — SIGNIFICANT CHANGE UP (ref 0.2–1.2)
BUN SERPL-MCNC: 13 MG/DL — SIGNIFICANT CHANGE UP (ref 7–23)
CALCIUM SERPL-MCNC: 7.6 MG/DL — LOW (ref 8.4–10.5)
CHLORIDE SERPL-SCNC: 100 MMOL/L — SIGNIFICANT CHANGE UP (ref 96–108)
CO2 SERPL-SCNC: 27 MMOL/L — SIGNIFICANT CHANGE UP (ref 22–31)
CREAT SERPL-MCNC: 0.63 MG/DL — SIGNIFICANT CHANGE UP (ref 0.5–1.3)
CRP SERPL-MCNC: 6.47 MG/DL — HIGH (ref 0–0.4)
GLUCOSE BLDC GLUCOMTR-MCNC: 159 MG/DL — HIGH (ref 70–99)
GLUCOSE BLDC GLUCOMTR-MCNC: 173 MG/DL — HIGH (ref 70–99)
GLUCOSE BLDC GLUCOMTR-MCNC: 279 MG/DL — HIGH (ref 70–99)
GLUCOSE BLDC GLUCOMTR-MCNC: 314 MG/DL — HIGH (ref 70–99)
GLUCOSE SERPL-MCNC: 292 MG/DL — HIGH (ref 70–99)
MAGNESIUM SERPL-MCNC: 2.2 MG/DL — SIGNIFICANT CHANGE UP (ref 1.6–2.6)
PHOSPHATE SERPL-MCNC: 3 MG/DL — SIGNIFICANT CHANGE UP (ref 2.5–4.5)
POTASSIUM SERPL-MCNC: 3.8 MMOL/L — SIGNIFICANT CHANGE UP (ref 3.5–5.3)
POTASSIUM SERPL-SCNC: 3.8 MMOL/L — SIGNIFICANT CHANGE UP (ref 3.5–5.3)
PROT SERPL-MCNC: 5.6 G/DL — LOW (ref 6–8.3)
SODIUM SERPL-SCNC: 137 MMOL/L — SIGNIFICANT CHANGE UP (ref 135–145)

## 2020-04-07 PROCEDURE — 99233 SBSQ HOSP IP/OBS HIGH 50: CPT

## 2020-04-07 RX ORDER — INSULIN LISPRO 100/ML
5 VIAL (ML) SUBCUTANEOUS
Refills: 0 | Status: DISCONTINUED | OUTPATIENT
Start: 2020-04-07 | End: 2020-04-07

## 2020-04-07 RX ORDER — INSULIN GLARGINE 100 [IU]/ML
18 INJECTION, SOLUTION SUBCUTANEOUS AT BEDTIME
Refills: 0 | Status: DISCONTINUED | OUTPATIENT
Start: 2020-04-07 | End: 2020-04-08

## 2020-04-07 RX ORDER — INSULIN LISPRO 100/ML
7 VIAL (ML) SUBCUTANEOUS
Refills: 0 | Status: DISCONTINUED | OUTPATIENT
Start: 2020-04-07 | End: 2020-04-08

## 2020-04-07 RX ADMIN — Medication 3 UNIT(S): at 08:13

## 2020-04-07 RX ADMIN — Medication 5 MILLIGRAM(S): at 17:13

## 2020-04-07 RX ADMIN — Medication 4: at 13:01

## 2020-04-07 RX ADMIN — Medication 1: at 17:12

## 2020-04-07 RX ADMIN — Medication 3: at 08:13

## 2020-04-07 RX ADMIN — Medication 10 MILLILITER(S): at 12:05

## 2020-04-07 RX ADMIN — POLYETHYLENE GLYCOL 3350 17 GRAM(S): 17 POWDER, FOR SOLUTION ORAL at 06:28

## 2020-04-07 RX ADMIN — INSULIN GLARGINE 18 UNIT(S): 100 INJECTION, SOLUTION SUBCUTANEOUS at 22:55

## 2020-04-07 RX ADMIN — ENOXAPARIN SODIUM 40 MILLIGRAM(S): 100 INJECTION SUBCUTANEOUS at 12:05

## 2020-04-07 RX ADMIN — Medication 1500 MILLIGRAM(S): at 12:05

## 2020-04-07 RX ADMIN — Medication 100 MILLIGRAM(S): at 12:04

## 2020-04-07 RX ADMIN — SENNA PLUS 2 TABLET(S): 8.6 TABLET ORAL at 22:55

## 2020-04-07 RX ADMIN — Medication 100 MILLIGRAM(S): at 06:28

## 2020-04-07 RX ADMIN — ZINC SULFATE TAB 220 MG (50 MG ZINC EQUIVALENT) 220 MILLIGRAM(S): 220 (50 ZN) TAB at 12:06

## 2020-04-07 RX ADMIN — Medication 100 MILLIGRAM(S): at 17:12

## 2020-04-07 RX ADMIN — Medication 7 UNIT(S): at 13:10

## 2020-04-07 RX ADMIN — Medication 7 UNIT(S): at 17:12

## 2020-04-07 RX ADMIN — Medication 5 MILLIGRAM(S): at 06:28

## 2020-04-07 NOTE — PROGRESS NOTE ADULT - PROBLEM SELECTOR PLAN 1
- COVID POSITIVE  - D/kyle plaquenil due to elevated Qtc 500.   - c/w anakinra given increased o2 requirements to NRB, on day 3/3, will change to anakinra q12h x3 days anf q24h ofr 3 days for a total 9 days of treatment.   - CRP 6.47, downtreding   - Encourage patient to prone and lay on side, OOBTC with assistance, incentive spirometry

## 2020-04-07 NOTE — PROVIDER CONTACT NOTE (OTHER) - SITUATION
Pt desaturated to 85% on 5LNC getting out of bed to chair by himself. PT noncompliant. PT pulled off NC & saturation on room air was 86%. Pt currently on Nonrebreather 10L with saturations of 95%. Pt desaturated to 85% on 5LNC getting out of bed to chair by himself. PT noncompliant. PT pulled off mask & saturation on room air was 86%. Pt currently on Nonrebreather 10L with saturations of 95%.

## 2020-04-07 NOTE — PROVIDER CONTACT NOTE (OTHER) - ASSESSMENT
A/O4; SOB noted; Saturations of 85 percent; pt transitioned from 5LNC to nonrebreather mask of 10L. Currently 95 %

## 2020-04-07 NOTE — PROGRESS NOTE ADULT - PROBLEM SELECTOR PLAN 3
- Fingersticks elevated to 300s, Hgb A1c 11.9% indication of poorly controlled diabetes   - Increased to 18Units Lantus and 7Units lispro TID before meal   - will continue to monitor and adjust as necessary

## 2020-04-07 NOTE — PROGRESS NOTE ADULT - SUBJECTIVE AND OBJECTIVE BOX
Patient is a 64y old  Male who presents with a chief complaint of SOB (06 Apr 2020 08:07)      SUBJECTIVE / OVERNIGHT EVENTS: Patient states that he is feeling better. Stated that his appetite has increased and now he is more eager to eat food. Denies any chest pain, headaches, dizziness,     MEDICATIONS  (STANDING):  anakinra Injectable 100 milliGRAM(s) SubCutaneous every 6 hours  ascorbic acid 1500 milliGRAM(s) Oral daily  bisacodyl 5 milliGRAM(s) Oral every 12 hours  dextrose 5%. 1000 milliLiter(s) (50 mL/Hr) IV Continuous <Continuous>  dextrose 50% Injectable 12.5 Gram(s) IV Push once  dextrose 50% Injectable 25 Gram(s) IV Push once  dextrose 50% Injectable 25 Gram(s) IV Push once  enoxaparin Injectable 40 milliGRAM(s) SubCutaneous daily  influenza   Vaccine 0.5 milliLiter(s) IntraMuscular once  insulin glargine Injectable (LANTUS) 18 Unit(s) SubCutaneous at bedtime  insulin lispro (HumaLOG) corrective regimen sliding scale   SubCutaneous three times a day before meals  insulin lispro (HumaLOG) corrective regimen sliding scale   SubCutaneous at bedtime  insulin lispro Injectable (HumaLOG) 7 Unit(s) SubCutaneous three times a day before meals  polyethylene glycol 3350 17 Gram(s) Oral two times a day  senna 2 Tablet(s) Oral at bedtime  zinc sulfate 220 milliGRAM(s) Oral daily    MEDICATIONS  (PRN):  acetaminophen   Tablet .. 650 milliGRAM(s) Oral every 4 hours PRN Temp greater or equal to 38.5C (101.3F)  dextrose 40% Gel 15 Gram(s) Oral once PRN Blood Glucose LESS THAN 70 milliGRAM(s)/deciliter  glucagon  Injectable 1 milliGRAM(s) IntraMuscular once PRN Glucose LESS THAN 70 milligrams/deciliter  guaifenesin/dextromethorphan  Syrup 10 milliLiter(s) Oral every 6 hours PRN Cough      Vital Signs Last 24 Hrs  T(C): 36.9 (07 Apr 2020 06:25), Max: 36.9 (07 Apr 2020 06:25)  T(F): 98.4 (07 Apr 2020 06:25), Max: 98.4 (07 Apr 2020 06:25)  HR: 91 (07 Apr 2020 08:14) (85 - 103)  BP: 94/62 (07 Apr 2020 08:14) (86/54 - 101/69)  BP(mean): --  RR: 22 (07 Apr 2020 12:58) (19 - 25)  SpO2: 95% (07 Apr 2020 12:58) (85% - 95%)  CAPILLARY BLOOD GLUCOSE      POCT Blood Glucose.: 314 mg/dL (07 Apr 2020 12:52)  POCT Blood Glucose.: 279 mg/dL (07 Apr 2020 08:12)  POCT Blood Glucose.: 308 mg/dL (06 Apr 2020 21:48)  POCT Blood Glucose.: 297 mg/dL (06 Apr 2020 18:10)    I&O's Summary    06 Apr 2020 07:01  -  07 Apr 2020 07:00  --------------------------------------------------------  IN: 0 mL / OUT: 400 mL / NET: -400 mL    07 Apr 2020 07:01  -  07 Apr 2020 13:53  --------------------------------------------------------  IN: 0 mL / OUT: 300 mL / NET: -300 mL        PHYSICAL EXAM:  GENERAL: intermittently on 5L and NRB  HEAD:  Atraumatic, Normocephalic  EYES: conjunctiva and sclera clear  NECK: Supple  CHEST/LUNG: Clear to auscultation bilaterally; No wheeze  HEART: Regular rate and rhythm; No murmurs, rubs, or gallops  ABDOMEN: Soft, Nontender, Nondistended; Bowel sounds present  EXTREMITIES:  2+ Peripheral Pulses, No edema        LABS:                        10.4   7.77  )-----------( 472      ( 06 Apr 2020 08:03 )             32.8     04-07    137  |  100  |  13  ----------------------------<  292<H>  3.8   |  27  |  0.63    Ca    7.6<L>      07 Apr 2020 07:16  Phos  3.0     04-07  Mg     2.2     04-07    TPro  5.6<L>  /  Alb  2.3<L>  /  TBili  0.4  /  DBili  x   /  AST  90<H>  /  ALT  80<H>  /  AlkPhos  205<H>  04-07                  RADIOLOGY & ADDITIONAL TESTS:    Imaging Personally Reviewed:    Consultant(s) Notes Reviewed:      Care Discussed with Consultants/Other Providers:

## 2020-04-07 NOTE — PROGRESS NOTE ADULT - PROBLEM SELECTOR PLAN 2
- Viral PNA 2/2 COVID Positive   - CXR with Bilateral Patchy Opacities  - patient intermittently between NC and NRB    - Repeat CXR showed some sign of edema within the right fissure, s/p 10 mg lasix IVP x1  - Encourage to proning, OOBTC with assistance.

## 2020-04-08 LAB
ALBUMIN SERPL ELPH-MCNC: 2.4 G/DL — LOW (ref 3.3–5)
ALP SERPL-CCNC: 213 U/L — HIGH (ref 40–120)
ALT FLD-CCNC: 120 U/L — HIGH (ref 10–45)
ANION GAP SERPL CALC-SCNC: 11 MMOL/L — SIGNIFICANT CHANGE UP (ref 5–17)
AST SERPL-CCNC: 142 U/L — HIGH (ref 10–40)
BASOPHILS # BLD AUTO: 0.01 K/UL — SIGNIFICANT CHANGE UP (ref 0–0.2)
BASOPHILS NFR BLD AUTO: 0.2 % — SIGNIFICANT CHANGE UP (ref 0–2)
BILIRUB SERPL-MCNC: 0.4 MG/DL — SIGNIFICANT CHANGE UP (ref 0.2–1.2)
BUN SERPL-MCNC: 11 MG/DL — SIGNIFICANT CHANGE UP (ref 7–23)
CALCIUM SERPL-MCNC: 8 MG/DL — LOW (ref 8.4–10.5)
CHLORIDE SERPL-SCNC: 96 MMOL/L — SIGNIFICANT CHANGE UP (ref 96–108)
CO2 SERPL-SCNC: 27 MMOL/L — SIGNIFICANT CHANGE UP (ref 22–31)
CREAT SERPL-MCNC: 0.58 MG/DL — SIGNIFICANT CHANGE UP (ref 0.5–1.3)
CRP SERPL-MCNC: 4.52 MG/DL — HIGH (ref 0–0.4)
D DIMER BLD IA.RAPID-MCNC: 589 NG/ML DDU — HIGH
EOSINOPHIL # BLD AUTO: 0.11 K/UL — SIGNIFICANT CHANGE UP (ref 0–0.5)
EOSINOPHIL NFR BLD AUTO: 2 % — SIGNIFICANT CHANGE UP (ref 0–6)
GLUCOSE BLDC GLUCOMTR-MCNC: 199 MG/DL — HIGH (ref 70–99)
GLUCOSE BLDC GLUCOMTR-MCNC: 209 MG/DL — HIGH (ref 70–99)
GLUCOSE BLDC GLUCOMTR-MCNC: 221 MG/DL — HIGH (ref 70–99)
GLUCOSE BLDC GLUCOMTR-MCNC: 236 MG/DL — HIGH (ref 70–99)
GLUCOSE BLDC GLUCOMTR-MCNC: 273 MG/DL — HIGH (ref 70–99)
GLUCOSE SERPL-MCNC: 270 MG/DL — HIGH (ref 70–99)
HCT VFR BLD CALC: 36.5 % — LOW (ref 39–50)
HGB BLD-MCNC: 11.2 G/DL — LOW (ref 13–17)
IMM GRANULOCYTES NFR BLD AUTO: 1.5 % — SIGNIFICANT CHANGE UP (ref 0–1.5)
LYMPHOCYTES # BLD AUTO: 0.96 K/UL — LOW (ref 1–3.3)
LYMPHOCYTES # BLD AUTO: 17.6 % — SIGNIFICANT CHANGE UP (ref 13–44)
MAGNESIUM SERPL-MCNC: 2.1 MG/DL — SIGNIFICANT CHANGE UP (ref 1.6–2.6)
MCHC RBC-ENTMCNC: 19.8 PG — LOW (ref 27–34)
MCHC RBC-ENTMCNC: 30.7 GM/DL — LOW (ref 32–36)
MCV RBC AUTO: 64.5 FL — LOW (ref 80–100)
MONOCYTES # BLD AUTO: 0.56 K/UL — SIGNIFICANT CHANGE UP (ref 0–0.9)
MONOCYTES NFR BLD AUTO: 10.2 % — SIGNIFICANT CHANGE UP (ref 2–14)
NEUTROPHILS # BLD AUTO: 3.75 K/UL — SIGNIFICANT CHANGE UP (ref 1.8–7.4)
NEUTROPHILS NFR BLD AUTO: 68.5 % — SIGNIFICANT CHANGE UP (ref 43–77)
NRBC # BLD: 0 /100 WBCS — SIGNIFICANT CHANGE UP (ref 0–0)
PHOSPHATE SERPL-MCNC: 3 MG/DL — SIGNIFICANT CHANGE UP (ref 2.5–4.5)
PLATELET # BLD AUTO: 596 K/UL — HIGH (ref 150–400)
POTASSIUM SERPL-MCNC: 3.6 MMOL/L — SIGNIFICANT CHANGE UP (ref 3.5–5.3)
POTASSIUM SERPL-SCNC: 3.6 MMOL/L — SIGNIFICANT CHANGE UP (ref 3.5–5.3)
PROT SERPL-MCNC: 6.3 G/DL — SIGNIFICANT CHANGE UP (ref 6–8.3)
RBC # BLD: 5.66 M/UL — SIGNIFICANT CHANGE UP (ref 4.2–5.8)
RBC # FLD: 15.7 % — HIGH (ref 10.3–14.5)
SODIUM SERPL-SCNC: 134 MMOL/L — LOW (ref 135–145)
WBC # BLD: 5.47 K/UL — SIGNIFICANT CHANGE UP (ref 3.8–10.5)
WBC # FLD AUTO: 5.47 K/UL — SIGNIFICANT CHANGE UP (ref 3.8–10.5)

## 2020-04-08 PROCEDURE — 99221 1ST HOSP IP/OBS SF/LOW 40: CPT

## 2020-04-08 PROCEDURE — 99232 SBSQ HOSP IP/OBS MODERATE 35: CPT | Mod: GC

## 2020-04-08 RX ORDER — ENOXAPARIN SODIUM 100 MG/ML
70 INJECTION SUBCUTANEOUS EVERY 12 HOURS
Refills: 0 | Status: DISCONTINUED | OUTPATIENT
Start: 2020-04-08 | End: 2020-04-14

## 2020-04-08 RX ORDER — ANAKINRA 100MG/0.67
100 SYRINGE (ML) SUBCUTANEOUS EVERY 12 HOURS
Refills: 0 | Status: COMPLETED | OUTPATIENT
Start: 2020-04-09 | End: 2020-04-11

## 2020-04-08 RX ORDER — INSULIN LISPRO 100/ML
VIAL (ML) SUBCUTANEOUS
Refills: 0 | Status: DISCONTINUED | OUTPATIENT
Start: 2020-04-08 | End: 2020-04-14

## 2020-04-08 RX ORDER — INSULIN GLARGINE 100 [IU]/ML
21 INJECTION, SOLUTION SUBCUTANEOUS AT BEDTIME
Refills: 0 | Status: DISCONTINUED | OUTPATIENT
Start: 2020-04-08 | End: 2020-04-13

## 2020-04-08 RX ORDER — INSULIN LISPRO 100/ML
VIAL (ML) SUBCUTANEOUS AT BEDTIME
Refills: 0 | Status: DISCONTINUED | OUTPATIENT
Start: 2020-04-08 | End: 2020-04-14

## 2020-04-08 RX ORDER — INSULIN LISPRO 100/ML
9 VIAL (ML) SUBCUTANEOUS
Refills: 0 | Status: DISCONTINUED | OUTPATIENT
Start: 2020-04-08 | End: 2020-04-13

## 2020-04-08 RX ORDER — ENOXAPARIN SODIUM 100 MG/ML
70 INJECTION SUBCUTANEOUS DAILY
Refills: 0 | Status: DISCONTINUED | OUTPATIENT
Start: 2020-04-08 | End: 2020-04-08

## 2020-04-08 RX ADMIN — Medication 7 UNIT(S): at 09:32

## 2020-04-08 RX ADMIN — POLYETHYLENE GLYCOL 3350 17 GRAM(S): 17 POWDER, FOR SOLUTION ORAL at 18:31

## 2020-04-08 RX ADMIN — Medication 2: at 22:20

## 2020-04-08 RX ADMIN — Medication 100 MILLIGRAM(S): at 12:55

## 2020-04-08 RX ADMIN — ZINC SULFATE TAB 220 MG (50 MG ZINC EQUIVALENT) 220 MILLIGRAM(S): 220 (50 ZN) TAB at 12:55

## 2020-04-08 RX ADMIN — ENOXAPARIN SODIUM 70 MILLIGRAM(S): 100 INJECTION SUBCUTANEOUS at 18:29

## 2020-04-08 RX ADMIN — Medication 4: at 18:30

## 2020-04-08 RX ADMIN — Medication 2: at 09:32

## 2020-04-08 RX ADMIN — Medication 1500 MILLIGRAM(S): at 12:55

## 2020-04-08 RX ADMIN — Medication 7 UNIT(S): at 12:53

## 2020-04-08 RX ADMIN — Medication 5 MILLIGRAM(S): at 06:34

## 2020-04-08 RX ADMIN — Medication 100 MILLIGRAM(S): at 00:00

## 2020-04-08 RX ADMIN — POLYETHYLENE GLYCOL 3350 17 GRAM(S): 17 POWDER, FOR SOLUTION ORAL at 06:34

## 2020-04-08 RX ADMIN — Medication 2: at 12:53

## 2020-04-08 RX ADMIN — Medication 9 UNIT(S): at 18:30

## 2020-04-08 RX ADMIN — INSULIN GLARGINE 21 UNIT(S): 100 INJECTION, SOLUTION SUBCUTANEOUS at 22:20

## 2020-04-08 RX ADMIN — Medication 100 MILLIGRAM(S): at 06:33

## 2020-04-08 NOTE — PROGRESS NOTE ADULT - PROBLEM SELECTOR PLAN 1
- COVID POSITIVE  - D/kyle plaquenil due to elevated Qtc 500.   - c/w anakinra given increased o2 requirements to NRB, on day 3/3, will change to anakinra q12h x3 days and q24h for 3 days for a total 9 days of treatment.   - CRP 6.47, downtrending   - Encourage patient to prone and lay on side, OOBTC with assistance, incentive spirometry - COVID POSITIVE  - D/kyle plaquenil due to elevated Qtc 500.   - c/w anakinra given increased o2 requirements to NRB, on day 3/3, will change to anakinra q12h x3 days and q24h for 3 days for a total 9 days of treatment.   - CRP 4.92, downtrending   - Encourage patient to prone and lay on side, OOBTC with assistance, incentive spirometry - COVID POSITIVE  - D/c plaquenil due to elevated Qtc 500.   - c/w anakinra given increased o2 requirements to NRB, on day 3/3, will change to anakinra q12h x3 days and q24h for 3 days for a total 9 days of treatment.   - CRP 4.92, downtrending   - Encourage patient to prone and lay on side, OOBTC with assistance, incentive spirometry

## 2020-04-08 NOTE — CONSULT NOTE ADULT - PROBLEM SELECTOR RECOMMENDATION 9
- Persistent hyperglycemia, eating better, no plans for steroids at this time  - Recommend increase lantus to 21 units QHS  - Recommend increase humalog to 9 units TID  - Recommend change SSI to MOD SSI AC and HS  - Recommend provider to RN teaching for insulin pen use, d/w Dr. MARTELL    Outpatient Plan  - Likely basal-bolus, new to insulin perhaps  - Maybe Basal+Meds based on clinical condition  - F/u [address], [phone] for apt    Mabel Covington DO  Pager 9AM-5PM: [phone]  Pager Nights and Weekends: [phone]

## 2020-04-08 NOTE — PROGRESS NOTE ADULT - ATTENDING COMMENTS
63 y/o male with hx of DM II, presented to the ED with complaints of cough, myalgias, fever and progressively worsening sob for the past several days, noted to have acute respiratory failure with hypoxia likely due to viral Pneumonia 2/2 to COVID, patient hypoxia worsening and increase   Plan: 65 y/o male with hx of DM II, presented to the ED with complaints of cough, myalgias, fever and progressively worsening sob for the past several days, noted to have acute respiratory failure with hypoxia likely due to viral Pneumonia 2/2 to COVID,   Plan:  Will start anticoagulation treatment as d-dimer trending up and hypoxia  Endo consult due to hyperglycemia  Follow Labs AM

## 2020-04-08 NOTE — CONSULT NOTE ADULT - ASSESSMENT
63 y/o male with uncontrolled T2DM (A1c 11.6%) here with pneumonia likely due to COVID viral infection. Currently eating better than before and no plans of starting any steroids. Limited history obtained by discussing his care with physician Dr. Myles and chart notes.

## 2020-04-08 NOTE — PROGRESS NOTE ADULT - PROBLEM SELECTOR PLAN 3
- Fingersticks elevated to 300s, Hgb A1c 11.9% indication of poorly controlled diabetes   - Increased to 18 Units Lantus and 7 Units lispro TID before meal   - will continue to monitor and adjust as necessary - Fingersticks elevated to 300s, Hgb A1c 11.9% indication of poorly controlled diabetes   - Increased to 18 Units Lantus and 7 Units lispro TID before meal   - Endo consulted for uncontrolled hyperglycemia   - will continue to monitor and adjust as necessary

## 2020-04-08 NOTE — PROGRESS NOTE ADULT - SUBJECTIVE AND OBJECTIVE BOX
Patient is a 64y old  Male who presents with a chief complaint of SOB (07 Apr 2020 13:52)      SUBJECTIVE / OVERNIGHT EVENTS: Patient stating that he broke out in a sweat last night after tylenol. No fever recorded overnight.     MEDICATIONS  (STANDING):  anakinra Injectable 100 milliGRAM(s) SubCutaneous every 6 hours  ascorbic acid 1500 milliGRAM(s) Oral daily  bisacodyl 5 milliGRAM(s) Oral every 12 hours  dextrose 5%. 1000 milliLiter(s) (50 mL/Hr) IV Continuous <Continuous>  dextrose 50% Injectable 12.5 Gram(s) IV Push once  dextrose 50% Injectable 25 Gram(s) IV Push once  dextrose 50% Injectable 25 Gram(s) IV Push once  enoxaparin Injectable 40 milliGRAM(s) SubCutaneous daily  influenza   Vaccine 0.5 milliLiter(s) IntraMuscular once  insulin glargine Injectable (LANTUS) 18 Unit(s) SubCutaneous at bedtime  insulin lispro (HumaLOG) corrective regimen sliding scale   SubCutaneous three times a day before meals  insulin lispro (HumaLOG) corrective regimen sliding scale   SubCutaneous at bedtime  insulin lispro Injectable (HumaLOG) 7 Unit(s) SubCutaneous three times a day before meals  polyethylene glycol 3350 17 Gram(s) Oral two times a day  senna 2 Tablet(s) Oral at bedtime  zinc sulfate 220 milliGRAM(s) Oral daily    MEDICATIONS  (PRN):  acetaminophen   Tablet .. 650 milliGRAM(s) Oral every 4 hours PRN Temp greater or equal to 38.5C (101.3F)  dextrose 40% Gel 15 Gram(s) Oral once PRN Blood Glucose LESS THAN 70 milliGRAM(s)/deciliter  glucagon  Injectable 1 milliGRAM(s) IntraMuscular once PRN Glucose LESS THAN 70 milligrams/deciliter  guaifenesin/dextromethorphan  Syrup 10 milliLiter(s) Oral every 6 hours PRN Cough      Vital Signs Last 24 Hrs  T(C): 36.5 (08 Apr 2020 05:24), Max: 37.7 (07 Apr 2020 21:12)  T(F): 97.7 (08 Apr 2020 05:24), Max: 99.8 (07 Apr 2020 21:12)  HR: 95 (08 Apr 2020 05:24) (80 - 104)  BP: 101/69 (08 Apr 2020 05:24) (90/59 - 107/71)  BP(mean): --  RR: 20 (08 Apr 2020 05:24) (18 - 25)  SpO2: 92% (08 Apr 2020 05:24) (85% - 95%)  CAPILLARY BLOOD GLUCOSE      POCT Blood Glucose.: 236 mg/dL (08 Apr 2020 09:22)  POCT Blood Glucose.: 199 mg/dL (08 Apr 2020 00:56)  POCT Blood Glucose.: 159 mg/dL (07 Apr 2020 22:24)  POCT Blood Glucose.: 173 mg/dL (07 Apr 2020 16:56)  POCT Blood Glucose.: 314 mg/dL (07 Apr 2020 12:52)    I&O's Summary    07 Apr 2020 07:01  -  08 Apr 2020 07:00  --------------------------------------------------------  IN: 480 mL / OUT: 700 mL / NET: -220 mL        PHYSICAL EXAM:  GENERAL: Currently on 5L NC   HEAD:  Atraumatic, Normocephalic  EYES: conjunctiva and sclera clear  NECK: Supple   CHEST/LUNG: crackles noted throughout; No wheeze  HEART: Regular rate and rhythm; No murmurs, rubs, or gallops  ABDOMEN: Soft, Nontender, Nondistended; Bowel sounds present  EXTREMITIES:  2+ Peripheral Pulses, No edema      LABS:    04-07    137  |  100  |  13  ----------------------------<  292<H>  3.8   |  27  |  0.63    Ca    7.6<L>      07 Apr 2020 07:16  Phos  3.0     04-07  Mg     2.2     04-07    TPro  5.6<L>  /  Alb  2.3<L>  /  TBili  0.4  /  DBili  x   /  AST  90<H>  /  ALT  80<H>  /  AlkPhos  205<H>  04-07

## 2020-04-08 NOTE — CONSULT NOTE ADULT - SUBJECTIVE AND OBJECTIVE BOX
Reason For Consult: T2DM    HPI: 63yo male with hx of DM II, presented to the ED with complaints of cough, myalgias, fever and progressively worsening sob for the past several days. Pt states his symptoms started approximately 4-5 days ago. He initially complained of a mild cough which worsened with sob. He started developing fever as well. 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 He states he stopped going to work 1 week ago, a few days before developing symptoms. No travel history either.  Pt denies symptoms of cp, palpitations, abd pain, N/V. He currently has complaints of sob with movement and diffuse body aches.     In the ED pt noted to have respiratory compromise, requiring supplemental O2 with NC. (31 Mar 2020 09:20)    Endocrine History:  Cell phone not working. Patient not answering bedside phone  RN gowned and gloved, unable to speak with her.  Reviewed Chart Notes - Admitted with fever, cough and myalgias. Differential diagnosis of COVID viral pneumonia. Spoke with physician Dr. Myles. Stated patient is eating better now than before. No plans of starting any steroids. At home, was only on metformin. Unable to obtain any other history at this time. ordered for consistent carb diet.      PAST MEDICAL & SURGICAL HISTORY:  High cholesterol  Diabetes  No significant past surgical history      FAMILY HISTORY:  FH: type 2 diabetes    Social History: Unable to obtain    Outpatient Medications: Only on Metformin as per chart notes    MEDICATIONS  (STANDING):  ascorbic acid 1500 milliGRAM(s) Oral daily  bisacodyl 5 milliGRAM(s) Oral every 12 hours  dextrose 5%. 1000 milliLiter(s) (50 mL/Hr) IV Continuous <Continuous>  dextrose 50% Injectable 12.5 Gram(s) IV Push once  dextrose 50% Injectable 25 Gram(s) IV Push once  dextrose 50% Injectable 25 Gram(s) IV Push once  enoxaparin Injectable 70 milliGRAM(s) SubCutaneous every 12 hours  influenza   Vaccine 0.5 milliLiter(s) IntraMuscular once  insulin glargine Injectable (LANTUS) 18 Unit(s) SubCutaneous at bedtime  insulin lispro (HumaLOG) corrective regimen sliding scale LOW  SubCutaneous three times a day before meals  insulin lispro (HumaLOG) corrective regimen sliding scale LOW  SubCutaneous at bedtime  insulin lispro Injectable (HumaLOG) 7 Unit(s) SubCutaneous three times a day before meals  polyethylene glycol 3350 17 Gram(s) Oral two times a day  senna 2 Tablet(s) Oral at bedtime  zinc sulfate 220 milliGRAM(s) Oral daily    MEDICATIONS  (PRN):  acetaminophen   Tablet .. 650 milliGRAM(s) Oral every 4 hours PRN Temp greater or equal to 38.5C (101.3F)  dextrose 40% Gel 15 Gram(s) Oral once PRN Blood Glucose LESS THAN 70 milliGRAM(s)/deciliter  glucagon  Injectable 1 milliGRAM(s) IntraMuscular once PRN Glucose LESS THAN 70 milligrams/deciliter  guaifenesin/dextromethorphan  Syrup 10 milliLiter(s) Oral every 6 hours PRN Cough      Allergies  No Known Allergies    Review of Systems:  UNABLE TO OBTAIN    PHYSICAL EXAM:  VITALS: T(C): 36.8 (04-08-20 @ 13:04)  T(F): 98.2 (04-08-20 @ 13:04), Max: 99.8 (04-07-20 @ 21:12)  HR: 91 (04-08-20 @ 13:04) (80 - 95)  BP: 111/73 (04-08-20 @ 13:04) (101/67 - 111/73)  RR:  (18 - 22)  SpO2:  (92% - 95%)  Wt(kg): 70 kg    Progress Note Internal Medicine 04/08 PE:  PHYSICAL EXAM:  GENERAL: Currently on 5L NC   HEAD:  Atraumatic, Normocephalic  EYES: conjunctiva and sclera clear  NECK: Supple   CHEST/LUNG: crackles noted throughout; No wheeze  HEART: Regular rate and rhythm; No murmurs, rubs, or gallops  ABDOMEN: Soft, Nontender, Nondistended; Bowel sounds present  EXTREMITIES:  2+ Peripheral Pulses, No edema    POCT Blood Glucose.: 221 mg/dL (04-08-20 @ 12:16) H7+2  POCT Blood Glucose.: 236 mg/dL (04-08-20 @ 09:22) H7+2  POCT Blood Glucose.: 199 mg/dL (04-08-20 @ 00:56)    POCT Blood Glucose.: 159 mg/dL (04-07-20 @ 22:24) L18  POCT Blood Glucose.: 173 mg/dL (04-07-20 @ 16:56) H7+1  POCT Blood Glucose.: 314 mg/dL (04-07-20 @ 12:52) H7+4  POCT Blood Glucose.: 279 mg/dL (04-07-20 @ 08:12) H3+3    POCT Blood Glucose.: 308 mg/dL (04-06-20 @ 21:48) L14  POCT Blood Glucose.: 297 mg/dL (04-06-20 @ 18:10)  POCT Blood Glucose.: 247 mg/dL (04-06-20 @ 12:47)  POCT Blood Glucose.: 243 mg/dL (04-06-20 @ 08:55)    POCT Blood Glucose.: 217 mg/dL (04-05-20 @ 21:04)  POCT Blood Glucose.: 246 mg/dL (04-05-20 @ 17:13)                            11.2   5.47  )-----------( 596      ( 08 Apr 2020 10:07 )             36.5       04-08    134<L>  |  96  |  11  ----------------------------<  270<H>  3.6   |  27  |  0.58    EGFR if : 123  EGFR if non : 106    Ca    8.0<L>      04-08  Mg     2.1     04-08  Phos  3.0     04-08    TPro  6.3  /  Alb  2.4<L>  /  TBili  0.4  /  DBili  x   /  AST  142<H>  /  ALT  120<H>  /  AlkPhos  213<H>  04-08    Hemoglobin A1C, Whole Blood: 11.6 % <H> [4.0 - 5.6] (04-02-20 @ 09:50)

## 2020-04-09 LAB
ALBUMIN SERPL ELPH-MCNC: 2.5 G/DL — LOW (ref 3.3–5)
ALP SERPL-CCNC: 176 U/L — HIGH (ref 40–120)
ALT FLD-CCNC: 130 U/L — HIGH (ref 10–45)
ANION GAP SERPL CALC-SCNC: 7 MMOL/L — SIGNIFICANT CHANGE UP (ref 5–17)
AST SERPL-CCNC: 133 U/L — HIGH (ref 10–40)
BASOPHILS # BLD AUTO: 0.01 K/UL — SIGNIFICANT CHANGE UP (ref 0–0.2)
BASOPHILS NFR BLD AUTO: 0.2 % — SIGNIFICANT CHANGE UP (ref 0–2)
BILIRUB SERPL-MCNC: 0.4 MG/DL — SIGNIFICANT CHANGE UP (ref 0.2–1.2)
BUN SERPL-MCNC: 12 MG/DL — SIGNIFICANT CHANGE UP (ref 7–23)
CALCIUM SERPL-MCNC: 7.8 MG/DL — LOW (ref 8.4–10.5)
CHLORIDE SERPL-SCNC: 99 MMOL/L — SIGNIFICANT CHANGE UP (ref 96–108)
CO2 SERPL-SCNC: 29 MMOL/L — SIGNIFICANT CHANGE UP (ref 22–31)
CREAT SERPL-MCNC: 0.58 MG/DL — SIGNIFICANT CHANGE UP (ref 0.5–1.3)
CRP SERPL-MCNC: 2.73 MG/DL — HIGH (ref 0–0.4)
EOSINOPHIL # BLD AUTO: 0.09 K/UL — SIGNIFICANT CHANGE UP (ref 0–0.5)
EOSINOPHIL NFR BLD AUTO: 1.6 % — SIGNIFICANT CHANGE UP (ref 0–6)
GLUCOSE BLDC GLUCOMTR-MCNC: 131 MG/DL — HIGH (ref 70–99)
GLUCOSE BLDC GLUCOMTR-MCNC: 143 MG/DL — HIGH (ref 70–99)
GLUCOSE BLDC GLUCOMTR-MCNC: 196 MG/DL — HIGH (ref 70–99)
GLUCOSE BLDC GLUCOMTR-MCNC: 275 MG/DL — HIGH (ref 70–99)
GLUCOSE SERPL-MCNC: 259 MG/DL — HIGH (ref 70–99)
HCT VFR BLD CALC: 31.7 % — LOW (ref 39–50)
HGB BLD-MCNC: 9.8 G/DL — LOW (ref 13–17)
IMM GRANULOCYTES NFR BLD AUTO: 1 % — SIGNIFICANT CHANGE UP (ref 0–1.5)
LYMPHOCYTES # BLD AUTO: 1.38 K/UL — SIGNIFICANT CHANGE UP (ref 1–3.3)
LYMPHOCYTES # BLD AUTO: 23.8 % — SIGNIFICANT CHANGE UP (ref 13–44)
MAGNESIUM SERPL-MCNC: 2.1 MG/DL — SIGNIFICANT CHANGE UP (ref 1.6–2.6)
MCHC RBC-ENTMCNC: 19.7 PG — LOW (ref 27–34)
MCHC RBC-ENTMCNC: 30.9 GM/DL — LOW (ref 32–36)
MCV RBC AUTO: 63.7 FL — LOW (ref 80–100)
MONOCYTES # BLD AUTO: 0.58 K/UL — SIGNIFICANT CHANGE UP (ref 0–0.9)
MONOCYTES NFR BLD AUTO: 10 % — SIGNIFICANT CHANGE UP (ref 2–14)
NEUTROPHILS # BLD AUTO: 3.67 K/UL — SIGNIFICANT CHANGE UP (ref 1.8–7.4)
NEUTROPHILS NFR BLD AUTO: 63.4 % — SIGNIFICANT CHANGE UP (ref 43–77)
NRBC # BLD: 0 /100 WBCS — SIGNIFICANT CHANGE UP (ref 0–0)
PHOSPHATE SERPL-MCNC: 2.8 MG/DL — SIGNIFICANT CHANGE UP (ref 2.5–4.5)
PLATELET # BLD AUTO: 571 K/UL — HIGH (ref 150–400)
POTASSIUM SERPL-MCNC: 4.1 MMOL/L — SIGNIFICANT CHANGE UP (ref 3.5–5.3)
POTASSIUM SERPL-SCNC: 4.1 MMOL/L — SIGNIFICANT CHANGE UP (ref 3.5–5.3)
PROT SERPL-MCNC: 5.7 G/DL — LOW (ref 6–8.3)
RBC # BLD: 4.98 M/UL — SIGNIFICANT CHANGE UP (ref 4.2–5.8)
RBC # FLD: 15.3 % — HIGH (ref 10.3–14.5)
SODIUM SERPL-SCNC: 135 MMOL/L — SIGNIFICANT CHANGE UP (ref 135–145)
WBC # BLD: 5.79 K/UL — SIGNIFICANT CHANGE UP (ref 3.8–10.5)
WBC # FLD AUTO: 5.79 K/UL — SIGNIFICANT CHANGE UP (ref 3.8–10.5)

## 2020-04-09 PROCEDURE — 99232 SBSQ HOSP IP/OBS MODERATE 35: CPT | Mod: GC

## 2020-04-09 RX ORDER — SODIUM CHLORIDE 9 MG/ML
500 INJECTION, SOLUTION INTRAVENOUS ONCE
Refills: 0 | Status: COMPLETED | OUTPATIENT
Start: 2020-04-09 | End: 2020-04-09

## 2020-04-09 RX ADMIN — INSULIN GLARGINE 21 UNIT(S): 100 INJECTION, SOLUTION SUBCUTANEOUS at 22:15

## 2020-04-09 RX ADMIN — Medication 100 MILLIGRAM(S): at 17:09

## 2020-04-09 RX ADMIN — ENOXAPARIN SODIUM 70 MILLIGRAM(S): 100 INJECTION SUBCUTANEOUS at 22:47

## 2020-04-09 RX ADMIN — Medication 6: at 08:37

## 2020-04-09 RX ADMIN — SENNA PLUS 2 TABLET(S): 8.6 TABLET ORAL at 22:48

## 2020-04-09 RX ADMIN — Medication 9 UNIT(S): at 17:57

## 2020-04-09 RX ADMIN — SODIUM CHLORIDE 1000 MILLILITER(S): 9 INJECTION, SOLUTION INTRAVENOUS at 14:00

## 2020-04-09 RX ADMIN — Medication 10 MILLILITER(S): at 22:48

## 2020-04-09 RX ADMIN — POLYETHYLENE GLYCOL 3350 17 GRAM(S): 17 POWDER, FOR SOLUTION ORAL at 06:20

## 2020-04-09 RX ADMIN — ENOXAPARIN SODIUM 70 MILLIGRAM(S): 100 INJECTION SUBCUTANEOUS at 06:00

## 2020-04-09 RX ADMIN — ZINC SULFATE TAB 220 MG (50 MG ZINC EQUIVALENT) 220 MILLIGRAM(S): 220 (50 ZN) TAB at 11:35

## 2020-04-09 RX ADMIN — POLYETHYLENE GLYCOL 3350 17 GRAM(S): 17 POWDER, FOR SOLUTION ORAL at 17:09

## 2020-04-09 RX ADMIN — Medication 1500 MILLIGRAM(S): at 11:35

## 2020-04-09 RX ADMIN — Medication 100 MILLIGRAM(S): at 06:00

## 2020-04-09 RX ADMIN — Medication 5 MILLIGRAM(S): at 17:09

## 2020-04-09 RX ADMIN — Medication 9 UNIT(S): at 12:28

## 2020-04-09 RX ADMIN — Medication 9 UNIT(S): at 08:37

## 2020-04-09 NOTE — PROGRESS NOTE ADULT - PROBLEM SELECTOR PLAN 1
- COVID POSITIVE  - D/c plaquenil due to elevated Qtc 500.   - c/w anakinra given increased o2 requirements to NRB, on day 3/3, will change to anakinra q12h x3 days and q24h for 3 days for a total 9 days of treatment.   - CRP 4.92, downtrending   - Encourage patient to prone and lay on side, OOBTC with assistance, incentive spirometry

## 2020-04-09 NOTE — PROGRESS NOTE ADULT - SUBJECTIVE AND OBJECTIVE BOX
Patient is a 64y old  Male who presents with a chief complaint of SOB (08 Apr 2020 17:03)      SUBJECTIVE / OVERNIGHT EVENTS: No acute events overnight. Patient remains intermittently on NRB and NC. Having more of a appetite this morning.     MEDICATIONS  (STANDING):  anakinra Injectable 100 milliGRAM(s) SubCutaneous every 12 hours  ascorbic acid 1500 milliGRAM(s) Oral daily  bisacodyl 5 milliGRAM(s) Oral every 12 hours  dextrose 5%. 1000 milliLiter(s) (50 mL/Hr) IV Continuous <Continuous>  dextrose 50% Injectable 12.5 Gram(s) IV Push once  dextrose 50% Injectable 25 Gram(s) IV Push once  dextrose 50% Injectable 25 Gram(s) IV Push once  enoxaparin Injectable 70 milliGRAM(s) SubCutaneous every 12 hours  influenza   Vaccine 0.5 milliLiter(s) IntraMuscular once  insulin glargine Injectable (LANTUS) 21 Unit(s) SubCutaneous at bedtime  insulin lispro (HumaLOG) corrective regimen sliding scale   SubCutaneous three times a day before meals  insulin lispro (HumaLOG) corrective regimen sliding scale   SubCutaneous at bedtime  insulin lispro Injectable (HumaLOG) 9 Unit(s) SubCutaneous three times a day before meals  polyethylene glycol 3350 17 Gram(s) Oral two times a day  senna 2 Tablet(s) Oral at bedtime  zinc sulfate 220 milliGRAM(s) Oral daily    MEDICATIONS  (PRN):  acetaminophen   Tablet .. 650 milliGRAM(s) Oral every 4 hours PRN Temp greater or equal to 38.5C (101.3F)  dextrose 40% Gel 15 Gram(s) Oral once PRN Blood Glucose LESS THAN 70 milliGRAM(s)/deciliter  glucagon  Injectable 1 milliGRAM(s) IntraMuscular once PRN Glucose LESS THAN 70 milligrams/deciliter  guaifenesin/dextromethorphan  Syrup 10 milliLiter(s) Oral every 6 hours PRN Cough      Vital Signs Last 24 Hrs  T(C): 36.7 (09 Apr 2020 04:15), Max: 36.8 (08 Apr 2020 13:04)  T(F): 98.1 (09 Apr 2020 04:15), Max: 98.2 (08 Apr 2020 13:04)  HR: 73 (09 Apr 2020 04:15) (73 - 91)  BP: 110/71 (09 Apr 2020 04:15) (84/57 - 111/73)  BP(mean): --  RR: 19 (09 Apr 2020 04:15) (18 - 20)  SpO2: 96% (09 Apr 2020 04:15) (95% - 96%)  CAPILLARY BLOOD GLUCOSE      POCT Blood Glucose.: 275 mg/dL (09 Apr 2020 08:31)  POCT Blood Glucose.: 273 mg/dL (08 Apr 2020 21:43)  POCT Blood Glucose.: 209 mg/dL (08 Apr 2020 17:55)  POCT Blood Glucose.: 221 mg/dL (08 Apr 2020 12:16)  POCT Blood Glucose.: 236 mg/dL (08 Apr 2020 09:22)    I&O's Summary    08 Apr 2020 07:01  -  09 Apr 2020 07:00  --------------------------------------------------------  IN: 50 mL / OUT: 1950 mL / NET: -1900 mL        PHYSICAL EXAM:  GENERAL: Currently on 5L NC   HEAD:  Atraumatic, Normocephalic  EYES: conjunctiva and sclera clear  NECK: Supple   CHEST/LUNG: crackles noted throughout; No wheeze  HEART: Regular rate and rhythm; No murmurs, rubs, or gallops  ABDOMEN: Soft, Nontender, Nondistended; Bowel sounds present  EXTREMITIES:  2+ Peripheral Pulses, No edema      LABS:                        9.8    5.79  )-----------( 571      ( 09 Apr 2020 07:51 )             31.7     04-08    134<L>  |  96  |  11  ----------------------------<  270<H>  3.6   |  27  |  0.58    Ca    8.0<L>      08 Apr 2020 10:07  Phos  3.0     04-08  Mg     2.1     04-08    TPro  6.3  /  Alb  2.4<L>  /  TBili  0.4  /  DBili  x   /  AST  142<H>  /  ALT  120<H>  /  AlkPhos  213<H>  04-08                  RADIOLOGY & ADDITIONAL TESTS:    Imaging Personally Reviewed:    Consultant(s) Notes Reviewed:      Care Discussed with Consultants/Other Providers:

## 2020-04-09 NOTE — PROGRESS NOTE ADULT - ATTENDING COMMENTS
63 y/o male with hx of DM II, presented to the ED with complaints of cough, myalgias, fever and progressively worsening sob for the past several days, noted to have acute respiratory failure with hypoxia likely due to viral Pneumonia 2/2 to COVID, oxygenation improving alternating between 5L NC and NRB   Plan:  Continue supplemental O2 to maintain O2 92-96% wean as tolerated  Continue Insulin as endo  recommendation   Continue VTE treatment due to high risk  Continue Anakinra as per protocol

## 2020-04-09 NOTE — PROGRESS NOTE ADULT - PROBLEM SELECTOR PLAN 3
- Fingersticks elevated to 300s, Hgb A1c 11.9% indication of poorly controlled diabetes   - Endo consulted increased to 21 units lantus and 9 Units premeal   - will continue to monitor and adjust as necessary

## 2020-04-10 LAB
ALBUMIN SERPL ELPH-MCNC: 2.5 G/DL — LOW (ref 3.3–5)
ALP SERPL-CCNC: 172 U/L — HIGH (ref 40–120)
ALT FLD-CCNC: 115 U/L — HIGH (ref 10–45)
ANION GAP SERPL CALC-SCNC: 10 MMOL/L — SIGNIFICANT CHANGE UP (ref 5–17)
AST SERPL-CCNC: 91 U/L — HIGH (ref 10–40)
BILIRUB SERPL-MCNC: 0.4 MG/DL — SIGNIFICANT CHANGE UP (ref 0.2–1.2)
BUN SERPL-MCNC: 9 MG/DL — SIGNIFICANT CHANGE UP (ref 7–23)
CALCIUM SERPL-MCNC: 8.1 MG/DL — LOW (ref 8.4–10.5)
CHLORIDE SERPL-SCNC: 100 MMOL/L — SIGNIFICANT CHANGE UP (ref 96–108)
CO2 SERPL-SCNC: 27 MMOL/L — SIGNIFICANT CHANGE UP (ref 22–31)
CREAT SERPL-MCNC: 0.62 MG/DL — SIGNIFICANT CHANGE UP (ref 0.5–1.3)
CRP SERPL-MCNC: 1.91 MG/DL — HIGH (ref 0–0.4)
D DIMER BLD IA.RAPID-MCNC: 261 NG/ML DDU — HIGH
GLUCOSE BLDC GLUCOMTR-MCNC: 170 MG/DL — HIGH (ref 70–99)
GLUCOSE BLDC GLUCOMTR-MCNC: 176 MG/DL — HIGH (ref 70–99)
GLUCOSE BLDC GLUCOMTR-MCNC: 178 MG/DL — HIGH (ref 70–99)
GLUCOSE BLDC GLUCOMTR-MCNC: 207 MG/DL — HIGH (ref 70–99)
GLUCOSE SERPL-MCNC: 247 MG/DL — HIGH (ref 70–99)
HCT VFR BLD CALC: 33.8 % — LOW (ref 39–50)
HGB BLD-MCNC: 10.2 G/DL — LOW (ref 13–17)
MAGNESIUM SERPL-MCNC: 2.1 MG/DL — SIGNIFICANT CHANGE UP (ref 1.6–2.6)
MCHC RBC-ENTMCNC: 19.7 PG — LOW (ref 27–34)
MCHC RBC-ENTMCNC: 30.2 GM/DL — LOW (ref 32–36)
MCV RBC AUTO: 65.3 FL — LOW (ref 80–100)
NRBC # BLD: 0 /100 WBCS — SIGNIFICANT CHANGE UP (ref 0–0)
PHOSPHATE SERPL-MCNC: 2.9 MG/DL — SIGNIFICANT CHANGE UP (ref 2.5–4.5)
PLATELET # BLD AUTO: 599 K/UL — HIGH (ref 150–400)
POTASSIUM SERPL-MCNC: 3.9 MMOL/L — SIGNIFICANT CHANGE UP (ref 3.5–5.3)
POTASSIUM SERPL-SCNC: 3.9 MMOL/L — SIGNIFICANT CHANGE UP (ref 3.5–5.3)
PROT SERPL-MCNC: 5.9 G/DL — LOW (ref 6–8.3)
RBC # BLD: 5.18 M/UL — SIGNIFICANT CHANGE UP (ref 4.2–5.8)
RBC # FLD: 15.8 % — HIGH (ref 10.3–14.5)
SODIUM SERPL-SCNC: 137 MMOL/L — SIGNIFICANT CHANGE UP (ref 135–145)
WBC # BLD: 6.5 K/UL — SIGNIFICANT CHANGE UP (ref 3.8–10.5)
WBC # FLD AUTO: 6.5 K/UL — SIGNIFICANT CHANGE UP (ref 3.8–10.5)

## 2020-04-10 PROCEDURE — 99232 SBSQ HOSP IP/OBS MODERATE 35: CPT | Mod: GC

## 2020-04-10 RX ORDER — SODIUM CHLORIDE 9 MG/ML
250 INJECTION, SOLUTION INTRAVENOUS ONCE
Refills: 0 | Status: COMPLETED | OUTPATIENT
Start: 2020-04-10 | End: 2020-04-10

## 2020-04-10 RX ADMIN — ZINC SULFATE TAB 220 MG (50 MG ZINC EQUIVALENT) 220 MILLIGRAM(S): 220 (50 ZN) TAB at 11:02

## 2020-04-10 RX ADMIN — SODIUM CHLORIDE 500 MILLILITER(S): 9 INJECTION, SOLUTION INTRAVENOUS at 15:01

## 2020-04-10 RX ADMIN — Medication 9 UNIT(S): at 17:59

## 2020-04-10 RX ADMIN — SENNA PLUS 2 TABLET(S): 8.6 TABLET ORAL at 23:50

## 2020-04-10 RX ADMIN — ENOXAPARIN SODIUM 70 MILLIGRAM(S): 100 INJECTION SUBCUTANEOUS at 17:14

## 2020-04-10 RX ADMIN — Medication 1500 MILLIGRAM(S): at 10:59

## 2020-04-10 RX ADMIN — Medication 9 UNIT(S): at 08:48

## 2020-04-10 RX ADMIN — POLYETHYLENE GLYCOL 3350 17 GRAM(S): 17 POWDER, FOR SOLUTION ORAL at 06:41

## 2020-04-10 RX ADMIN — Medication 2: at 17:59

## 2020-04-10 RX ADMIN — Medication 100 MILLIGRAM(S): at 06:41

## 2020-04-10 RX ADMIN — ENOXAPARIN SODIUM 70 MILLIGRAM(S): 100 INJECTION SUBCUTANEOUS at 06:41

## 2020-04-10 RX ADMIN — Medication 9 UNIT(S): at 13:05

## 2020-04-10 RX ADMIN — Medication 2: at 13:05

## 2020-04-10 RX ADMIN — INSULIN GLARGINE 21 UNIT(S): 100 INJECTION, SOLUTION SUBCUTANEOUS at 22:42

## 2020-04-10 RX ADMIN — Medication 5 MILLIGRAM(S): at 06:41

## 2020-04-10 RX ADMIN — Medication 10 MILLILITER(S): at 06:41

## 2020-04-10 RX ADMIN — Medication 100 MILLIGRAM(S): at 17:16

## 2020-04-10 RX ADMIN — Medication 4: at 08:48

## 2020-04-10 NOTE — PROGRESS NOTE ADULT - SUBJECTIVE AND OBJECTIVE BOX
Patient is a 64y old  Male who presents with a chief complaint of SOB (09 Apr 2020 08:54)      SUBJECTIVE / OVERNIGHT EVENTS: No acute events overnight.     MEDICATIONS  (STANDING):  anakinra Injectable 100 milliGRAM(s) SubCutaneous every 12 hours  ascorbic acid 1500 milliGRAM(s) Oral daily  bisacodyl 5 milliGRAM(s) Oral every 12 hours  dextrose 5%. 1000 milliLiter(s) (50 mL/Hr) IV Continuous <Continuous>  dextrose 50% Injectable 12.5 Gram(s) IV Push once  dextrose 50% Injectable 25 Gram(s) IV Push once  dextrose 50% Injectable 25 Gram(s) IV Push once  enoxaparin Injectable 70 milliGRAM(s) SubCutaneous every 12 hours  influenza   Vaccine 0.5 milliLiter(s) IntraMuscular once  insulin glargine Injectable (LANTUS) 21 Unit(s) SubCutaneous at bedtime  insulin lispro (HumaLOG) corrective regimen sliding scale   SubCutaneous three times a day before meals  insulin lispro (HumaLOG) corrective regimen sliding scale   SubCutaneous at bedtime  insulin lispro Injectable (HumaLOG) 9 Unit(s) SubCutaneous three times a day before meals  polyethylene glycol 3350 17 Gram(s) Oral two times a day  senna 2 Tablet(s) Oral at bedtime  zinc sulfate 220 milliGRAM(s) Oral daily    MEDICATIONS  (PRN):  acetaminophen   Tablet .. 650 milliGRAM(s) Oral every 4 hours PRN Temp greater or equal to 38.5C (101.3F)  dextrose 40% Gel 15 Gram(s) Oral once PRN Blood Glucose LESS THAN 70 milliGRAM(s)/deciliter  glucagon  Injectable 1 milliGRAM(s) IntraMuscular once PRN Glucose LESS THAN 70 milligrams/deciliter  guaifenesin/dextromethorphan  Syrup 10 milliLiter(s) Oral every 6 hours PRN Cough      Vital Signs Last 24 Hrs  T(C): 36.7 (10 Apr 2020 06:06), Max: 37.1 (09 Apr 2020 21:36)  T(F): 98 (10 Apr 2020 06:06), Max: 98.7 (09 Apr 2020 21:36)  HR: 80 (10 Apr 2020 06:06) (72 - 88)  BP: 105/75 (10 Apr 2020 06:06) (80/55 - 108/62)  BP(mean): --  RR: 20 (10 Apr 2020 06:06) (17 - 20)  SpO2: 93% (10 Apr 2020 06:06) (93% - 98%)  CAPILLARY BLOOD GLUCOSE      POCT Blood Glucose.: 207 mg/dL (10 Apr 2020 08:24)  POCT Blood Glucose.: 196 mg/dL (09 Apr 2020 21:25)  POCT Blood Glucose.: 131 mg/dL (09 Apr 2020 17:04)  POCT Blood Glucose.: 143 mg/dL (09 Apr 2020 12:27)    I&O's Summary    09 Apr 2020 07:01  -  10 Apr 2020 07:00  --------------------------------------------------------  IN: 500 mL / OUT: 1200 mL / NET: -700 mL        PHYSICAL EXAM:  GENERAL: On 5L NC   HEAD:  Atraumatic, Normocephalic  EYES: conjunctiva and sclera clear  CHEST/LUNG: Clear to auscultation bilaterally; No wheeze  HEART: Regular rate and rhythm; No murmurs, rubs, or gallops  ABDOMEN: Soft, Nontender, Nondistended; Bowel sounds present      LABS:                        9.8    5.79  )-----------( 571      ( 09 Apr 2020 07:51 )             31.7     04-09    135  |  99  |  12  ----------------------------<  259<H>  4.1   |  29  |  0.58    Ca    7.8<L>      09 Apr 2020 07:51  Phos  2.8     04-09  Mg     2.1     04-09    TPro  5.7<L>  /  Alb  2.5<L>  /  TBili  0.4  /  DBili  x   /  AST  133<H>  /  ALT  130<H>  /  AlkPhos  176<H>  04-09

## 2020-04-10 NOTE — PROGRESS NOTE ADULT - ATTENDING COMMENTS
65 y/o male with hx of DM II, presented to the ED with complaints of cough, myalgias, fever and progressively worsening sob for the past several days, noted to have acute respiratory failure with hypoxia likely due to viral Pneumonia 2/2 to COVID, No acute events overnight. H/H dropping with episodes of hypotension resolved with boluses, patient clinically stable, STAT CBC/D-dimer done around 3 pm     Plan:  Continue supplemental O2 to maintain O2 92-96% wean as tolerated  Continue Insulin as endo  recommendation   Continue VTE treatment due to high risk  Continue Anakinra as per protocol 63 y/o male with hx of DM II, presented to the ED with complaints of cough, myalgias, fever and progressively worsening sob for the past several days, noted to have acute respiratory failure with hypoxia likely due to viral Pneumonia 2/2 to COVID, No acute events overnight. H/H dropping with episodes of hypotension resolved with boluses, patient clinically stable, STAT CBC/D-dimer done around 3 pm h/h 10.2/33 and d-dimer 261 stable     Plan:  Monitor BP and give bolus as needed   Continue supplemental O2 to maintain O2 92-96% wean as tolerated  Continue Insulin as endo  recommendation   Continue VTE treatment due to high risk  Continue Anakinra as per protocol

## 2020-04-10 NOTE — PROGRESS NOTE ADULT - PROBLEM SELECTOR PLAN 3
- Fingersticks elevated to 300s, Hgb A1c 11.9% indication of poorly controlled diabetes   - Endo consulted increased to 21 units lantus and 9 Units premeal, appreciate recs   - will continue to monitor and adjust as necessary

## 2020-04-10 NOTE — PROVIDER CONTACT NOTE (OTHER) - ACTION/TREATMENT ORDERED:
Ordered to repeat BP in 30 min.  Will continue to monitor. Ordered to repeat BP in 30 min.  IVF NS 250ml bolus to be given.  Will continue to monitor.

## 2020-04-10 NOTE — PROVIDER CONTACT NOTE (OTHER) - ASSESSMENT
VS BP 79/58 HR 85 RR 90 SpO2 91% on O2 5LPM via NC.  No distress noted.  Denies any chest discomfort.  No s/s of SOB noted.

## 2020-04-10 NOTE — PROGRESS NOTE ADULT - PROBLEM SELECTOR PLAN 1
- COVID POSITIVE  - off plquenil due to elevated QTc   - c/w anakinra given increased o2 requirements to NRB, on day 3/3, will change to anakinra q12h x3 days and q24h for 3 days for a total 9 days of treatment.   - CRP 4.92, downtrending   - Encourage patient to prone and lay on side, OOBTC with assistance, incentive spirometry

## 2020-04-11 LAB
ALBUMIN SERPL ELPH-MCNC: 2.7 G/DL — LOW (ref 3.3–5)
ALP SERPL-CCNC: 157 U/L — HIGH (ref 40–120)
ALT FLD-CCNC: 125 U/L — HIGH (ref 10–45)
ANION GAP SERPL CALC-SCNC: 11 MMOL/L — SIGNIFICANT CHANGE UP (ref 5–17)
AST SERPL-CCNC: 110 U/L — HIGH (ref 10–40)
BILIRUB SERPL-MCNC: 0.4 MG/DL — SIGNIFICANT CHANGE UP (ref 0.2–1.2)
BUN SERPL-MCNC: 10 MG/DL — SIGNIFICANT CHANGE UP (ref 7–23)
CALCIUM SERPL-MCNC: 8.5 MG/DL — SIGNIFICANT CHANGE UP (ref 8.4–10.5)
CHLORIDE SERPL-SCNC: 102 MMOL/L — SIGNIFICANT CHANGE UP (ref 96–108)
CO2 SERPL-SCNC: 24 MMOL/L — SIGNIFICANT CHANGE UP (ref 22–31)
CREAT SERPL-MCNC: 0.61 MG/DL — SIGNIFICANT CHANGE UP (ref 0.5–1.3)
CRP SERPL-MCNC: 1.49 MG/DL — HIGH (ref 0–0.4)
GLUCOSE BLDC GLUCOMTR-MCNC: 165 MG/DL — HIGH (ref 70–99)
GLUCOSE BLDC GLUCOMTR-MCNC: 216 MG/DL — HIGH (ref 70–99)
GLUCOSE BLDC GLUCOMTR-MCNC: 273 MG/DL — HIGH (ref 70–99)
GLUCOSE SERPL-MCNC: 177 MG/DL — HIGH (ref 70–99)
HCT VFR BLD CALC: 32.6 % — LOW (ref 39–50)
HGB BLD-MCNC: 10.3 G/DL — LOW (ref 13–17)
MAGNESIUM SERPL-MCNC: 2.1 MG/DL — SIGNIFICANT CHANGE UP (ref 1.6–2.6)
MCHC RBC-ENTMCNC: 20.2 PG — LOW (ref 27–34)
MCHC RBC-ENTMCNC: 31.6 GM/DL — LOW (ref 32–36)
MCV RBC AUTO: 63.9 FL — LOW (ref 80–100)
NRBC # BLD: 0 /100 WBCS — SIGNIFICANT CHANGE UP (ref 0–0)
PHOSPHATE SERPL-MCNC: 4 MG/DL — SIGNIFICANT CHANGE UP (ref 2.5–4.5)
PLATELET # BLD AUTO: 642 K/UL — HIGH (ref 150–400)
POTASSIUM SERPL-MCNC: 4.5 MMOL/L — SIGNIFICANT CHANGE UP (ref 3.5–5.3)
POTASSIUM SERPL-SCNC: 4.5 MMOL/L — SIGNIFICANT CHANGE UP (ref 3.5–5.3)
PROT SERPL-MCNC: 6.2 G/DL — SIGNIFICANT CHANGE UP (ref 6–8.3)
RBC # BLD: 5.1 M/UL — SIGNIFICANT CHANGE UP (ref 4.2–5.8)
RBC # FLD: 16.2 % — HIGH (ref 10.3–14.5)
SODIUM SERPL-SCNC: 137 MMOL/L — SIGNIFICANT CHANGE UP (ref 135–145)
WBC # BLD: 5.84 K/UL — SIGNIFICANT CHANGE UP (ref 3.8–10.5)
WBC # FLD AUTO: 5.84 K/UL — SIGNIFICANT CHANGE UP (ref 3.8–10.5)

## 2020-04-11 PROCEDURE — 99233 SBSQ HOSP IP/OBS HIGH 50: CPT

## 2020-04-11 RX ADMIN — INSULIN GLARGINE 21 UNIT(S): 100 INJECTION, SOLUTION SUBCUTANEOUS at 21:53

## 2020-04-11 RX ADMIN — Medication 9 UNIT(S): at 08:11

## 2020-04-11 RX ADMIN — Medication 9 UNIT(S): at 12:18

## 2020-04-11 RX ADMIN — POLYETHYLENE GLYCOL 3350 17 GRAM(S): 17 POWDER, FOR SOLUTION ORAL at 17:55

## 2020-04-11 RX ADMIN — ENOXAPARIN SODIUM 70 MILLIGRAM(S): 100 INJECTION SUBCUTANEOUS at 17:54

## 2020-04-11 RX ADMIN — ENOXAPARIN SODIUM 70 MILLIGRAM(S): 100 INJECTION SUBCUTANEOUS at 04:54

## 2020-04-11 RX ADMIN — SENNA PLUS 2 TABLET(S): 8.6 TABLET ORAL at 21:53

## 2020-04-11 RX ADMIN — Medication 9 UNIT(S): at 17:55

## 2020-04-11 RX ADMIN — Medication 5 MILLIGRAM(S): at 04:54

## 2020-04-11 RX ADMIN — Medication 100 MILLIGRAM(S): at 04:54

## 2020-04-11 RX ADMIN — Medication 5 MILLIGRAM(S): at 17:54

## 2020-04-11 RX ADMIN — Medication 1500 MILLIGRAM(S): at 12:17

## 2020-04-11 RX ADMIN — Medication 100 MILLIGRAM(S): at 17:54

## 2020-04-11 RX ADMIN — Medication 2: at 08:10

## 2020-04-11 RX ADMIN — Medication 2: at 12:18

## 2020-04-11 RX ADMIN — Medication 6: at 17:54

## 2020-04-11 RX ADMIN — ZINC SULFATE TAB 220 MG (50 MG ZINC EQUIVALENT) 220 MILLIGRAM(S): 220 (50 ZN) TAB at 12:18

## 2020-04-11 RX ADMIN — POLYETHYLENE GLYCOL 3350 17 GRAM(S): 17 POWDER, FOR SOLUTION ORAL at 04:54

## 2020-04-11 NOTE — PROGRESS NOTE ADULT - PROBLEM SELECTOR PLAN 2
- Viral PNA 2/2 COVID Positive   - CXR with Bilateral Patchy Opacities  - patient intermittently between NC and NRB    - Repeat CXR showed some sign of edema within the right fissure, s/p 10 mg lasix IVP x1  - Encourage to proning, OOBTC with assistance.  - saturating  96% on 5L NC, will attempt to titrate down

## 2020-04-11 NOTE — PROGRESS NOTE ADULT - SUBJECTIVE AND OBJECTIVE BOX
***************************************************************  Ashley Kaur, PGY1  Internal Medicine   pager: NS: [phone] [facility]: [phone]  ***************************************************************    PROGRESS NOTE:     Patient is a 64y old  Male who presents with a chief complaint of SOB (10 Apr 2020 08:33)    SUBJECTIVE / OVERNIGHT EVENTS:  No acute events overnight. Patient this morning has improved shortness of breath. denies chest pain, abdominal pain.     ADDITIONAL REVIEW OF SYSTEMS: 10 point ROS negative except per HPI    MEDICATIONS  (STANDING):  anakinra Injectable 100 milliGRAM(s) SubCutaneous every 12 hours  ascorbic acid 1500 milliGRAM(s) Oral daily  bisacodyl 5 milliGRAM(s) Oral every 12 hours  dextrose 5%. 1000 milliLiter(s) (50 mL/Hr) IV Continuous <Continuous>  dextrose 50% Injectable 12.5 Gram(s) IV Push once  dextrose 50% Injectable 25 Gram(s) IV Push once  dextrose 50% Injectable 25 Gram(s) IV Push once  enoxaparin Injectable 70 milliGRAM(s) SubCutaneous every 12 hours  influenza   Vaccine 0.5 milliLiter(s) IntraMuscular once  insulin glargine Injectable (LANTUS) 21 Unit(s) SubCutaneous at bedtime  insulin lispro (HumaLOG) corrective regimen sliding scale   SubCutaneous three times a day before meals  insulin lispro (HumaLOG) corrective regimen sliding scale   SubCutaneous at bedtime  insulin lispro Injectable (HumaLOG) 9 Unit(s) SubCutaneous three times a day before meals  polyethylene glycol 3350 17 Gram(s) Oral two times a day  senna 2 Tablet(s) Oral at bedtime  zinc sulfate 220 milliGRAM(s) Oral daily    MEDICATIONS  (PRN):  acetaminophen   Tablet .. 650 milliGRAM(s) Oral every 4 hours PRN Temp greater or equal to 38.5C (101.3F)  dextrose 40% Gel 15 Gram(s) Oral once PRN Blood Glucose LESS THAN 70 milliGRAM(s)/deciliter  glucagon  Injectable 1 milliGRAM(s) IntraMuscular once PRN Glucose LESS THAN 70 milligrams/deciliter  guaifenesin/dextromethorphan  Syrup 10 milliLiter(s) Oral every 6 hours PRN Cough      CAPILLARY BLOOD GLUCOSE      POCT Blood Glucose.: 170 mg/dL (10 Apr 2020 22:27)  POCT Blood Glucose.: 176 mg/dL (10 Apr 2020 17:51)  POCT Blood Glucose.: 178 mg/dL (10 Apr 2020 13:00)    I&O's Summary    10 Apr 2020 07:01  -  11 Apr 2020 07:00  --------------------------------------------------------  IN: 0 mL / OUT: 1200 mL / NET: -1200 mL    11 Apr 2020 07:01  -  11 Apr 2020 11:53  --------------------------------------------------------  IN: 0 mL / OUT: 250 mL / NET: -250 mL        PHYSICAL EXAM:  Vital Signs Last 24 Hrs  T(C): 36.8 (11 Apr 2020 06:49), Max: 36.9 (10 Apr 2020 21:52)  T(F): 98.3 (11 Apr 2020 06:49), Max: 98.5 (10 Apr 2020 21:52)  HR: 76 (11 Apr 2020 06:49) (76 - 85)  BP: 92/60 (11 Apr 2020 06:49) (79/58 - 106/69)  BP(mean): --  RR: 20 (11 Apr 2020 06:49) (20 - 20)  SpO2: 96% (11 Apr 2020 06:49) (91% - 96%)    CONSTITUTIONAL: NAD, well-developed  RESPIRATORY: Normal respiratory effort; lungs are clear to auscultation bilaterally  CARDIOVASCULAR: Regular rate and rhythm, normal S1 and S2, no murmur/rub/gallop; No lower extremity edema;     LABS:                        10.3   5.84  )-----------( 642      ( 11 Apr 2020 07:04 )             32.6     04-11    137  |  102  |  10  ----------------------------<  177<H>  4.5   |  24  |  0.61    Ca    8.5      11 Apr 2020 07:04  Phos  4.0     04-11  Mg     2.1     04-11    TPro  6.2  /  Alb  2.7<L>  /  TBili  0.4  /  DBili  x   /  AST  110<H>  /  ALT  125<H>  /  AlkPhos  157<H>  04-11                RADIOLOGY & ADDITIONAL TESTS:  Results Reviewed:   Imaging Personally Reviewed:  Electrocardiogram Personally Reviewed:    COORDINATION OF CARE:  Care Discussed with Consultants/Other Providers [Y/N]:  Prior or Outpatient Records Reviewed [Y/N]:

## 2020-04-12 LAB
ALBUMIN SERPL ELPH-MCNC: 2.7 G/DL — LOW (ref 3.3–5)
ALP SERPL-CCNC: 135 U/L — HIGH (ref 40–120)
ALT FLD-CCNC: 130 U/L — HIGH (ref 10–45)
ANION GAP SERPL CALC-SCNC: 11 MMOL/L — SIGNIFICANT CHANGE UP (ref 5–17)
AST SERPL-CCNC: 102 U/L — HIGH (ref 10–40)
BILIRUB SERPL-MCNC: 0.4 MG/DL — SIGNIFICANT CHANGE UP (ref 0.2–1.2)
BUN SERPL-MCNC: 12 MG/DL — SIGNIFICANT CHANGE UP (ref 7–23)
CALCIUM SERPL-MCNC: 8.5 MG/DL — SIGNIFICANT CHANGE UP (ref 8.4–10.5)
CHLORIDE SERPL-SCNC: 102 MMOL/L — SIGNIFICANT CHANGE UP (ref 96–108)
CO2 SERPL-SCNC: 24 MMOL/L — SIGNIFICANT CHANGE UP (ref 22–31)
CREAT SERPL-MCNC: 0.59 MG/DL — SIGNIFICANT CHANGE UP (ref 0.5–1.3)
GLUCOSE BLDC GLUCOMTR-MCNC: 189 MG/DL — HIGH (ref 70–99)
GLUCOSE BLDC GLUCOMTR-MCNC: 217 MG/DL — HIGH (ref 70–99)
GLUCOSE BLDC GLUCOMTR-MCNC: 239 MG/DL — HIGH (ref 70–99)
GLUCOSE SERPL-MCNC: 117 MG/DL — HIGH (ref 70–99)
HCT VFR BLD CALC: 34.5 % — LOW (ref 39–50)
HGB BLD-MCNC: 10.7 G/DL — LOW (ref 13–17)
MAGNESIUM SERPL-MCNC: 2.1 MG/DL — SIGNIFICANT CHANGE UP (ref 1.6–2.6)
MCHC RBC-ENTMCNC: 20 PG — LOW (ref 27–34)
MCHC RBC-ENTMCNC: 31 GM/DL — LOW (ref 32–36)
MCV RBC AUTO: 64.5 FL — LOW (ref 80–100)
NRBC # BLD: 0 /100 WBCS — SIGNIFICANT CHANGE UP (ref 0–0)
PHOSPHATE SERPL-MCNC: 4 MG/DL — SIGNIFICANT CHANGE UP (ref 2.5–4.5)
PLATELET # BLD AUTO: 605 K/UL — HIGH (ref 150–400)
POTASSIUM SERPL-MCNC: 4.5 MMOL/L — SIGNIFICANT CHANGE UP (ref 3.5–5.3)
POTASSIUM SERPL-SCNC: 4.5 MMOL/L — SIGNIFICANT CHANGE UP (ref 3.5–5.3)
PROT SERPL-MCNC: 6.3 G/DL — SIGNIFICANT CHANGE UP (ref 6–8.3)
RBC # BLD: 5.35 M/UL — SIGNIFICANT CHANGE UP (ref 4.2–5.8)
RBC # FLD: 16.6 % — HIGH (ref 10.3–14.5)
SODIUM SERPL-SCNC: 137 MMOL/L — SIGNIFICANT CHANGE UP (ref 135–145)
WBC # BLD: 5.76 K/UL — SIGNIFICANT CHANGE UP (ref 3.8–10.5)
WBC # FLD AUTO: 5.76 K/UL — SIGNIFICANT CHANGE UP (ref 3.8–10.5)

## 2020-04-12 PROCEDURE — 99233 SBSQ HOSP IP/OBS HIGH 50: CPT

## 2020-04-12 RX ORDER — ANAKINRA 100MG/0.67
100 SYRINGE (ML) SUBCUTANEOUS DAILY
Refills: 0 | Status: COMPLETED | OUTPATIENT
Start: 2020-04-12 | End: 2020-04-14

## 2020-04-12 RX ADMIN — Medication 1500 MILLIGRAM(S): at 15:46

## 2020-04-12 RX ADMIN — Medication 100 MILLIGRAM(S): at 15:47

## 2020-04-12 RX ADMIN — ZINC SULFATE TAB 220 MG (50 MG ZINC EQUIVALENT) 220 MILLIGRAM(S): 220 (50 ZN) TAB at 15:47

## 2020-04-12 RX ADMIN — Medication 5 MILLIGRAM(S): at 04:33

## 2020-04-12 RX ADMIN — Medication 9 UNIT(S): at 12:25

## 2020-04-12 RX ADMIN — Medication 9 UNIT(S): at 07:51

## 2020-04-12 RX ADMIN — Medication 9 UNIT(S): at 18:13

## 2020-04-12 RX ADMIN — Medication 4: at 18:12

## 2020-04-12 RX ADMIN — ENOXAPARIN SODIUM 70 MILLIGRAM(S): 100 INJECTION SUBCUTANEOUS at 04:32

## 2020-04-12 RX ADMIN — POLYETHYLENE GLYCOL 3350 17 GRAM(S): 17 POWDER, FOR SOLUTION ORAL at 04:32

## 2020-04-12 RX ADMIN — SENNA PLUS 2 TABLET(S): 8.6 TABLET ORAL at 21:35

## 2020-04-12 RX ADMIN — Medication 5 MILLIGRAM(S): at 18:13

## 2020-04-12 RX ADMIN — POLYETHYLENE GLYCOL 3350 17 GRAM(S): 17 POWDER, FOR SOLUTION ORAL at 18:13

## 2020-04-12 RX ADMIN — INSULIN GLARGINE 21 UNIT(S): 100 INJECTION, SOLUTION SUBCUTANEOUS at 21:35

## 2020-04-12 RX ADMIN — ENOXAPARIN SODIUM 70 MILLIGRAM(S): 100 INJECTION SUBCUTANEOUS at 18:13

## 2020-04-12 RX ADMIN — Medication 2: at 12:30

## 2020-04-12 NOTE — PROGRESS NOTE ADULT - PROBLEM SELECTOR PLAN 1
- COVID POSITIVE  - off plquenil due to elevated QTc   - c/w anakinra given increased o2 requirements to NRB, on day 3/3, will change to anakinra q12h x3 days and q24h for 3 days for a total 9 days of treatment.   - CRP 4.92, downtrending   - Encourage patient to prone and lay on side, OOBTC with assistance, incentive spirometry - COVID POSITIVE  - off plquenil due to elevated QTc   - c/w anakinra given increased o2 requirements to NRB, on day 3/3, will change to anakinra q12h x3 days and q24h for 3 days for a total 9 days of treatment.   - CRP  downtrending   - Encourage patient to prone and lay on side, OOBTC with assistance, incentive spirometry

## 2020-04-12 NOTE — PROGRESS NOTE ADULT - PROBLEM SELECTOR PLAN 2
- Viral PNA 2/2 COVID Positive   - CXR with Bilateral Patchy Opacities   - Repeat CXR showed some sign of edema within the right fissure, s/p 10 mg lasix IVP x1  - Encourage to proning, OOBTC with assistance.  - saturating  94% on 2L NC, will continue to wean and assess status with ambulation - Viral PNA 2/2 COVID Positive   - CXR with Bilateral Patchy Opacities   - Repeat CXR showed some sign of edema within the right fissure, s/p 10 mg lasix IVP x1  - Encourage to proning, OOBTC with assistance.  - saturating  94%-96 % on 2L NC, will continue to wean and assess status with ambulation

## 2020-04-12 NOTE — PROGRESS NOTE ADULT - SUBJECTIVE AND OBJECTIVE BOX
Patient is a 64y old  Male who presents with a chief complaint of SOB (11 Apr 2020 11:53)      SUBJECTIVE / OVERNIGHT EVENTS: Patient stating that she still having sweats at night patient remains afebrile. Patient stating that he feels more SOB with ambulation. Denies any CP, headahces, dizziness, abdominal pain, n/v.     MEDICATIONS  (STANDING):  ascorbic acid 1500 milliGRAM(s) Oral daily  bisacodyl 5 milliGRAM(s) Oral every 12 hours  dextrose 5%. 1000 milliLiter(s) (50 mL/Hr) IV Continuous <Continuous>  dextrose 50% Injectable 12.5 Gram(s) IV Push once  dextrose 50% Injectable 25 Gram(s) IV Push once  dextrose 50% Injectable 25 Gram(s) IV Push once  enoxaparin Injectable 70 milliGRAM(s) SubCutaneous every 12 hours  influenza   Vaccine 0.5 milliLiter(s) IntraMuscular once  insulin glargine Injectable (LANTUS) 21 Unit(s) SubCutaneous at bedtime  insulin lispro (HumaLOG) corrective regimen sliding scale   SubCutaneous three times a day before meals  insulin lispro (HumaLOG) corrective regimen sliding scale   SubCutaneous at bedtime  insulin lispro Injectable (HumaLOG) 9 Unit(s) SubCutaneous three times a day before meals  polyethylene glycol 3350 17 Gram(s) Oral two times a day  senna 2 Tablet(s) Oral at bedtime  zinc sulfate 220 milliGRAM(s) Oral daily    MEDICATIONS  (PRN):  acetaminophen   Tablet .. 650 milliGRAM(s) Oral every 4 hours PRN Temp greater or equal to 38.5C (101.3F)  dextrose 40% Gel 15 Gram(s) Oral once PRN Blood Glucose LESS THAN 70 milliGRAM(s)/deciliter  glucagon  Injectable 1 milliGRAM(s) IntraMuscular once PRN Glucose LESS THAN 70 milligrams/deciliter  guaifenesin/dextromethorphan  Syrup 10 milliLiter(s) Oral every 6 hours PRN Cough      Vital Signs Last 24 Hrs  T(C): 36.4 (12 Apr 2020 04:33), Max: 36.7 (11 Apr 2020 22:10)  T(F): 97.5 (12 Apr 2020 04:33), Max: 98.1 (11 Apr 2020 22:10)  HR: 67 (12 Apr 2020 04:33) (67 - 83)  BP: 98/69 (12 Apr 2020 04:33) (86/58 - 98/69)  BP(mean): --  RR: 20 (12 Apr 2020 04:33) (20 - 20)  SpO2: 96% (12 Apr 2020 04:33) (93% - 96%)  CAPILLARY BLOOD GLUCOSE      POCT Blood Glucose.: 216 mg/dL (11 Apr 2020 21:39)  POCT Blood Glucose.: 273 mg/dL (11 Apr 2020 17:15)  POCT Blood Glucose.: 165 mg/dL (11 Apr 2020 12:00)    I&O's Summary    11 Apr 2020 07:01  -  12 Apr 2020 07:00  --------------------------------------------------------  IN: 0 mL / OUT: 250 mL / NET: -250 mL    12 Apr 2020 07:01  -  12 Apr 2020 11:04  --------------------------------------------------------  IN: 0 mL / OUT: 650 mL / NET: -650 mL        PHYSICAL EXAM:  GENERAL: NAD, well-developed  HEAD:  Atraumatic, Normocephalic  EYES: EOMI, PERRLA, conjunctiva and sclera clear  NECK: Supple, No JVD  CHEST/LUNG: Clear to auscultation bilaterally; No wheeze  HEART: Regular rate and rhythm; No murmurs, rubs, or gallops  ABDOMEN: Soft, Nontender, Nondistended; Bowel sounds present  EXTREMITIES:  2+ Peripheral Pulses, No clubbing, cyanosis, or edema  PSYCH: AAOx3  NEUROLOGY: non-focal  SKIN: No rashes or lesions    LABS:                        10.7   5.76  )-----------( 605      ( 12 Apr 2020 06:33 )             34.5     04-12    137  |  102  |  12  ----------------------------<  117<H>  4.5   |  24  |  0.59    Ca    8.5      12 Apr 2020 06:33  Phos  4.0     04-12  Mg     2.1     04-12    TPro  6.3  /  Alb  2.7<L>  /  TBili  0.4  /  DBili  x   /  AST  102<H>  /  ALT  130<H>  /  AlkPhos  135<H>  04-12 Patient is a 64y old  Male who presents with a chief complaint of SOB (11 Apr 2020 11:53)      SUBJECTIVE / OVERNIGHT EVENTS: Patient stating that she still having sweats at night patient remains afebrile. Patient stating that he feels more SOB with ambulation. Denies any CP, headahces, dizziness, abdominal pain, n/v, + generalized weakness     MEDICATIONS  (STANDING):  ascorbic acid 1500 milliGRAM(s) Oral daily  bisacodyl 5 milliGRAM(s) Oral every 12 hours  dextrose 5%. 1000 milliLiter(s) (50 mL/Hr) IV Continuous <Continuous>  dextrose 50% Injectable 12.5 Gram(s) IV Push once  dextrose 50% Injectable 25 Gram(s) IV Push once  dextrose 50% Injectable 25 Gram(s) IV Push once  enoxaparin Injectable 70 milliGRAM(s) SubCutaneous every 12 hours  influenza   Vaccine 0.5 milliLiter(s) IntraMuscular once  insulin glargine Injectable (LANTUS) 21 Unit(s) SubCutaneous at bedtime  insulin lispro (HumaLOG) corrective regimen sliding scale   SubCutaneous three times a day before meals  insulin lispro (HumaLOG) corrective regimen sliding scale   SubCutaneous at bedtime  insulin lispro Injectable (HumaLOG) 9 Unit(s) SubCutaneous three times a day before meals  polyethylene glycol 3350 17 Gram(s) Oral two times a day  senna 2 Tablet(s) Oral at bedtime  zinc sulfate 220 milliGRAM(s) Oral daily    MEDICATIONS  (PRN):  acetaminophen   Tablet .. 650 milliGRAM(s) Oral every 4 hours PRN Temp greater or equal to 38.5C (101.3F)  dextrose 40% Gel 15 Gram(s) Oral once PRN Blood Glucose LESS THAN 70 milliGRAM(s)/deciliter  glucagon  Injectable 1 milliGRAM(s) IntraMuscular once PRN Glucose LESS THAN 70 milligrams/deciliter  guaifenesin/dextromethorphan  Syrup 10 milliLiter(s) Oral every 6 hours PRN Cough      Vital Signs Last 24 Hrs  T(C): 36.4 (12 Apr 2020 04:33), Max: 36.7 (11 Apr 2020 22:10)  T(F): 97.5 (12 Apr 2020 04:33), Max: 98.1 (11 Apr 2020 22:10)  HR: 67 (12 Apr 2020 04:33) (67 - 83)  BP: 98/69 (12 Apr 2020 04:33) (86/58 - 98/69)  BP(mean): --  RR: 20 (12 Apr 2020 04:33) (20 - 20)  SpO2: 96% (12 Apr 2020 04:33) (93% - 96%)  CAPILLARY BLOOD GLUCOSE      POCT Blood Glucose.: 216 mg/dL (11 Apr 2020 21:39)  POCT Blood Glucose.: 273 mg/dL (11 Apr 2020 17:15)  POCT Blood Glucose.: 165 mg/dL (11 Apr 2020 12:00)    I&O's Summary    11 Apr 2020 07:01  -  12 Apr 2020 07:00  --------------------------------------------------------  IN: 0 mL / OUT: 250 mL / NET: -250 mL    12 Apr 2020 07:01  -  12 Apr 2020 11:04  --------------------------------------------------------  IN: 0 mL / OUT: 650 mL / NET: -650 mL        PHYSICAL EXAM:  GENERAL: NAD, well-developed  HEAD:  Atraumatic, Normocephalic  EYES: EOMI, PERRLA, conjunctiva and sclera clear  NECK: Supple, No JVD  CHEST/LUNG: Clear to auscultation bilaterally; No wheezing  HEART: Regular rate and rhythm; No murmurs, rubs, or gallops  ABDOMEN: Soft, Nontender, Nondistended; Bowel sounds present  EXTREMITIES:  2+ Peripheral Pulses, No clubbing, cyanosis, or edema  PSYCH: not agitated  NEUROLOGY: non-focal  SKIN: No rashes or lesions    LABS:                        10.7   5.76  )-----------( 605      ( 12 Apr 2020 06:33 )             34.5     04-12    137  |  102  |  12  ----------------------------<  117<H>  4.5   |  24  |  0.59    Ca    8.5      12 Apr 2020 06:33  Phos  4.0     04-12  Mg     2.1     04-12    TPro  6.3  /  Alb  2.7<L>  /  TBili  0.4  /  DBili  x   /  AST  102<H>  /  ALT  130<H>  /  AlkPhos  135<H>  04-12

## 2020-04-13 DIAGNOSIS — I10 ESSENTIAL (PRIMARY) HYPERTENSION: ICD-10-CM

## 2020-04-13 DIAGNOSIS — E78.2 MIXED HYPERLIPIDEMIA: ICD-10-CM

## 2020-04-13 LAB
ALBUMIN SERPL ELPH-MCNC: 3 G/DL — LOW (ref 3.3–5)
ALP SERPL-CCNC: 130 U/L — HIGH (ref 40–120)
ALT FLD-CCNC: 130 U/L — HIGH (ref 10–45)
ANION GAP SERPL CALC-SCNC: 11 MMOL/L — SIGNIFICANT CHANGE UP (ref 5–17)
AST SERPL-CCNC: 78 U/L — HIGH (ref 10–40)
BASOPHILS # BLD AUTO: 0.04 K/UL — SIGNIFICANT CHANGE UP (ref 0–0.2)
BASOPHILS NFR BLD AUTO: 0.6 % — SIGNIFICANT CHANGE UP (ref 0–2)
BILIRUB SERPL-MCNC: 0.5 MG/DL — SIGNIFICANT CHANGE UP (ref 0.2–1.2)
BUN SERPL-MCNC: 15 MG/DL — SIGNIFICANT CHANGE UP (ref 7–23)
CALCIUM SERPL-MCNC: 8.9 MG/DL — SIGNIFICANT CHANGE UP (ref 8.4–10.5)
CHLORIDE SERPL-SCNC: 98 MMOL/L — SIGNIFICANT CHANGE UP (ref 96–108)
CO2 SERPL-SCNC: 25 MMOL/L — SIGNIFICANT CHANGE UP (ref 22–31)
CREAT SERPL-MCNC: 0.6 MG/DL — SIGNIFICANT CHANGE UP (ref 0.5–1.3)
EOSINOPHIL # BLD AUTO: 0.1 K/UL — SIGNIFICANT CHANGE UP (ref 0–0.5)
EOSINOPHIL NFR BLD AUTO: 1.6 % — SIGNIFICANT CHANGE UP (ref 0–6)
GLUCOSE BLDC GLUCOMTR-MCNC: 101 MG/DL — HIGH (ref 70–99)
GLUCOSE BLDC GLUCOMTR-MCNC: 178 MG/DL — HIGH (ref 70–99)
GLUCOSE BLDC GLUCOMTR-MCNC: 98 MG/DL — SIGNIFICANT CHANGE UP (ref 70–99)
GLUCOSE SERPL-MCNC: 219 MG/DL — HIGH (ref 70–99)
HCT VFR BLD CALC: 34.9 % — LOW (ref 39–50)
HGB BLD-MCNC: 11 G/DL — LOW (ref 13–17)
IMM GRANULOCYTES NFR BLD AUTO: 1.1 % — SIGNIFICANT CHANGE UP (ref 0–1.5)
LYMPHOCYTES # BLD AUTO: 2.02 K/UL — SIGNIFICANT CHANGE UP (ref 1–3.3)
LYMPHOCYTES # BLD AUTO: 32.1 % — SIGNIFICANT CHANGE UP (ref 13–44)
MAGNESIUM SERPL-MCNC: 2 MG/DL — SIGNIFICANT CHANGE UP (ref 1.6–2.6)
MCHC RBC-ENTMCNC: 20.3 PG — LOW (ref 27–34)
MCHC RBC-ENTMCNC: 31.5 GM/DL — LOW (ref 32–36)
MCV RBC AUTO: 64.4 FL — LOW (ref 80–100)
MONOCYTES # BLD AUTO: 0.58 K/UL — SIGNIFICANT CHANGE UP (ref 0–0.9)
MONOCYTES NFR BLD AUTO: 9.2 % — SIGNIFICANT CHANGE UP (ref 2–14)
NEUTROPHILS # BLD AUTO: 3.48 K/UL — SIGNIFICANT CHANGE UP (ref 1.8–7.4)
NEUTROPHILS NFR BLD AUTO: 55.4 % — SIGNIFICANT CHANGE UP (ref 43–77)
NRBC # BLD: 0 /100 WBCS — SIGNIFICANT CHANGE UP (ref 0–0)
PHOSPHATE SERPL-MCNC: 3.7 MG/DL — SIGNIFICANT CHANGE UP (ref 2.5–4.5)
PLATELET # BLD AUTO: 660 K/UL — HIGH (ref 150–400)
POTASSIUM SERPL-MCNC: 4.9 MMOL/L — SIGNIFICANT CHANGE UP (ref 3.5–5.3)
POTASSIUM SERPL-SCNC: 4.9 MMOL/L — SIGNIFICANT CHANGE UP (ref 3.5–5.3)
PROT SERPL-MCNC: 6.7 G/DL — SIGNIFICANT CHANGE UP (ref 6–8.3)
RBC # BLD: 5.42 M/UL — SIGNIFICANT CHANGE UP (ref 4.2–5.8)
RBC # FLD: 17.6 % — HIGH (ref 10.3–14.5)
SODIUM SERPL-SCNC: 134 MMOL/L — LOW (ref 135–145)
WBC # BLD: 6.29 K/UL — SIGNIFICANT CHANGE UP (ref 3.8–10.5)
WBC # FLD AUTO: 6.29 K/UL — SIGNIFICANT CHANGE UP (ref 3.8–10.5)

## 2020-04-13 PROCEDURE — 99231 SBSQ HOSP IP/OBS SF/LOW 25: CPT

## 2020-04-13 PROCEDURE — 99232 SBSQ HOSP IP/OBS MODERATE 35: CPT | Mod: GC

## 2020-04-13 RX ORDER — INSULIN GLARGINE 100 [IU]/ML
18 INJECTION, SOLUTION SUBCUTANEOUS AT BEDTIME
Refills: 0 | Status: DISCONTINUED | OUTPATIENT
Start: 2020-04-13 | End: 2020-04-14

## 2020-04-13 RX ORDER — INSULIN LISPRO 100/ML
11 VIAL (ML) SUBCUTANEOUS
Refills: 0 | Status: DISCONTINUED | OUTPATIENT
Start: 2020-04-13 | End: 2020-04-14

## 2020-04-13 RX ORDER — SODIUM CHLORIDE 9 MG/ML
250 INJECTION INTRAMUSCULAR; INTRAVENOUS; SUBCUTANEOUS ONCE
Refills: 0 | Status: COMPLETED | OUTPATIENT
Start: 2020-04-13 | End: 2020-04-13

## 2020-04-13 RX ADMIN — Medication 5 MILLIGRAM(S): at 17:42

## 2020-04-13 RX ADMIN — Medication 5 MILLIGRAM(S): at 06:41

## 2020-04-13 RX ADMIN — Medication 1500 MILLIGRAM(S): at 12:33

## 2020-04-13 RX ADMIN — INSULIN GLARGINE 18 UNIT(S): 100 INJECTION, SOLUTION SUBCUTANEOUS at 22:00

## 2020-04-13 RX ADMIN — ZINC SULFATE TAB 220 MG (50 MG ZINC EQUIVALENT) 220 MILLIGRAM(S): 220 (50 ZN) TAB at 12:33

## 2020-04-13 RX ADMIN — Medication 9 UNIT(S): at 13:19

## 2020-04-13 RX ADMIN — Medication 100 MILLIGRAM(S): at 12:33

## 2020-04-13 RX ADMIN — SODIUM CHLORIDE 250 MILLILITER(S): 9 INJECTION INTRAMUSCULAR; INTRAVENOUS; SUBCUTANEOUS at 14:45

## 2020-04-13 RX ADMIN — POLYETHYLENE GLYCOL 3350 17 GRAM(S): 17 POWDER, FOR SOLUTION ORAL at 06:42

## 2020-04-13 RX ADMIN — Medication 9 UNIT(S): at 07:59

## 2020-04-13 RX ADMIN — POLYETHYLENE GLYCOL 3350 17 GRAM(S): 17 POWDER, FOR SOLUTION ORAL at 17:41

## 2020-04-13 RX ADMIN — ENOXAPARIN SODIUM 70 MILLIGRAM(S): 100 INJECTION SUBCUTANEOUS at 06:41

## 2020-04-13 RX ADMIN — Medication 11 UNIT(S): at 17:41

## 2020-04-13 RX ADMIN — Medication 4: at 08:00

## 2020-04-13 RX ADMIN — ENOXAPARIN SODIUM 70 MILLIGRAM(S): 100 INJECTION SUBCUTANEOUS at 17:42

## 2020-04-13 NOTE — PROGRESS NOTE ADULT - PROBLEM SELECTOR PLAN 2
- Viral PNA 2/2 COVID Positive   - CXR with Bilateral Patchy Opacities   - Repeat CXR showed some sign of edema within the right fissure, s/p 10 mg lasix IVP x1  - Encourage to proning, OOBTC with assistance.  - saturating  94%-96 % on 2L NC, will continue to wean and assess status with ambulation

## 2020-04-13 NOTE — PROGRESS NOTE ADULT - PROBLEM SELECTOR PLAN 1
- COVID POSITIVE  - off plquenil due to elevated QTc   - c/w anakinra given increased o2 requirements to NRB, on day 3/3, will change to anakinra q12h x3 days and q24h for 3 days for a total 9 days of treatment.   - CRP  downtrending   - Encourage patient to prone and lay on side, OOBTC with assistance, incentive spirometry - COVID POSITIVE  - off plquenil due to elevated QTc   - c/w anakinra, now qd dosing to be finished on 4/15.  - CRP  downtrending   - Encourage patient to prone and lay on side, OOBTC with assistance, incentive spirometry

## 2020-04-13 NOTE — PHYSICAL THERAPY INITIAL EVALUATION ADULT - PERTINENT HX OF CURRENT PROBLEM, REHAB EVAL
63yo male admitted to [facility] on 3/31 with hx of DM II, presented to the ED with complaints of cough, myalgias, fever and progressively worsening sob for the past several days, noted to have acute respiratory failure with hypoxia likely due to viral Pneumonia rule out COVID + Co-vid on 3/31

## 2020-04-13 NOTE — PROGRESS NOTE ADULT - SUBJECTIVE AND OBJECTIVE BOX
Chief Complaint/Follow-up on: T2D    Subjective: Patient not answering his phone at this time. He continues to require oxygen so team plans to have him ambulate and see if he has desaturation.      MEDICATIONS  (STANDING):  anakinra Injectable 100 milliGRAM(s) SubCutaneous daily  ascorbic acid 1500 milliGRAM(s) Oral daily  bisacodyl 5 milliGRAM(s) Oral every 12 hours  dextrose 5%. 1000 milliLiter(s) (50 mL/Hr) IV Continuous <Continuous>  dextrose 50% Injectable 12.5 Gram(s) IV Push once  dextrose 50% Injectable 25 Gram(s) IV Push once  dextrose 50% Injectable 25 Gram(s) IV Push once  enoxaparin Injectable 70 milliGRAM(s) SubCutaneous every 12 hours  influenza   Vaccine 0.5 milliLiter(s) IntraMuscular once  insulin glargine Injectable (LANTUS) 21 Unit(s) SubCutaneous at bedtime  insulin lispro (HumaLOG) corrective regimen sliding scale   SubCutaneous three times a day before meals  insulin lispro (HumaLOG) corrective regimen sliding scale   SubCutaneous at bedtime  insulin lispro Injectable (HumaLOG) 9 Unit(s) SubCutaneous three times a day before meals  polyethylene glycol 3350 17 Gram(s) Oral two times a day  senna 2 Tablet(s) Oral at bedtime  zinc sulfate 220 milliGRAM(s) Oral daily    MEDICATIONS  (PRN):  acetaminophen   Tablet .. 650 milliGRAM(s) Oral every 4 hours PRN Temp greater or equal to 38.5C (101.3F)  dextrose 40% Gel 15 Gram(s) Oral once PRN Blood Glucose LESS THAN 70 milliGRAM(s)/deciliter  glucagon  Injectable 1 milliGRAM(s) IntraMuscular once PRN Glucose LESS THAN 70 milligrams/deciliter  guaifenesin/dextromethorphan  Syrup 10 milliLiter(s) Oral every 6 hours PRN Cough      PHYSICAL EXAM:  VITALS: T(C): 36.7 (04-13-20 @ 04:11)  T(F): 98.1 (04-13-20 @ 04:11), Max: 98.2 (04-12-20 @ 21:47)  HR: 81 (04-13-20 @ 04:11) (81 - 94)  BP: 102/69 (04-13-20 @ 04:11) (92/63 - 103/68)  RR:  (20 - 22)  SpO2:  (94% - 95%)  Wt(kg): --  Due to [facility] policy during the evolving novel coronavirus outbreak, efforts are being made to limit unnecessary patient contacts to limit the spread of disease. Accordingly, patients without clear indication for physical exam or face to face interview from the Endocrine team will be adjusted in conjunction with conversations with primary provider team. This is being done for the safety of all the patients we care for.     POCT Blood Glucose.: 98 mg/dL (04-13-20 @ 12:01)  POCT Blood Glucose.: 217 mg/dL (04-12-20 @ 20:58)  POCT Blood Glucose.: 239 mg/dL (04-12-20 @ 17:34)  POCT Blood Glucose.: 189 mg/dL (04-12-20 @ 11:33)  POCT Blood Glucose.: 216 mg/dL (04-11-20 @ 21:39)  POCT Blood Glucose.: 273 mg/dL (04-11-20 @ 17:15)  POCT Blood Glucose.: 165 mg/dL (04-11-20 @ 12:00)  POCT Blood Glucose.: 170 mg/dL (04-10-20 @ 22:27)  POCT Blood Glucose.: 176 mg/dL (04-10-20 @ 17:51)  POCT Blood Glucose.: 178 mg/dL (04-10-20 @ 13:00)    04-13    134<L>  |  98  |  15  ----------------------------<  219<H>  4.9   |  25  |  0.60    EGFR if : 121  EGFR if non : 105    Ca    8.9      04-13  Mg     2.0     04-13  Phos  3.7     04-13    TPro  6.7  /  Alb  3.0<L>  /  TBili  0.5  /  DBili  x   /  AST  78<H>  /  ALT  130<H>  /  AlkPhos  130<H>  04-13            Hemoglobin A1C, Whole Blood: 11.6 % <H> [4.0 - 5.6] (04-02-20 @ 09:50)

## 2020-04-13 NOTE — PROGRESS NOTE ADULT - PROBLEM SELECTOR PLAN 3
-defer statin until LFTs improve. Had been on atorvastatin 10mg po qhs.   -discussed with care model A resident.   Tamanna Holbrook MD  Pager: [phone]  Nights and Weekends: [phone]

## 2020-04-13 NOTE — PROGRESS NOTE ADULT - ATTENDING COMMENTS
63 y/o male with hx of DM II, presented to the ED with complaints of cough, myalgias, fever and progressively worsening sob for the past several days, noted to have acute respiratory failure with hypoxia likely due to viral Pneumonia 2/2 to COVID, No acute events overnight.  Plan:  Monitor BP and give bolus as needed   Continue supplemental O2 to maintain O2 92-96% wean as tolerated now 1 L NC possilbe D/C tomorrow   Continue Insulin as endo  recommendation ( for discharge Metformin 1000mg and Lantus 16 units qhs   Continue VTE treatment due to high risk  Continue Anakinra as per protocol  Follow Labs AM 63 y/o male with hx of DM II, presented to the ED with complaints of cough, myalgias, fever and progressively worsening sob for the past several days, noted to have acute respiratory failure with hypoxia likely due to viral Pneumonia 2/2 to COVID, No acute events overnight.  Plan:  Monitor BP and give bolus as needed   Continue supplemental O2 to maintain O2 92-96% wean as tolerated now 1 L NC possilbe D/C tomorrow   Continue Insulin as endo  recommendation ( for discharge Metformin 1000mg BID and Lantus 16 units qhs   Continue VTE treatment due to high risk  Continue Anakinra as per protocol  Follow Labs AM

## 2020-04-13 NOTE — PHYSICAL THERAPY INITIAL EVALUATION ADULT - ADDITIONAL COMMENTS
lives w/ lives w/wife and dtrs in private home, steps to bedroom w/ handrail/ glasses for reading, hearing good, R handed/ does not use assistive device

## 2020-04-13 NOTE — PROGRESS NOTE ADULT - PROBLEM SELECTOR PLAN 1
-Adjust insulin as thus: Lantus 18 units sq qhs and humalog 11 units sq tid-ac  -Insulin pen teaching  Dispo: Patient not answering his phone so unable to confirm home meds or his pharmacy. Called his wife Mrs. Blair who told me he takes metformin 850mg po bid and glimepiride 4mg po bid.   For discharge: metformin 1000mg po bid and Lantus 16 units sq qhs. He will likely need testing supplies as well (pen needles, strips, lancets and alcohol pads) - needs to test BID. Discontinue glimepiride.  -Pt will need to call for an appointment at the endocrine practice after discharge when they are afebrile for at least 24 hours: [address] [place] [address], [facility] [address], [place] [address], [phone].

## 2020-04-13 NOTE — PHYSICAL THERAPY INITIAL EVALUATION ADULT - PLANNED THERAPY INTERVENTIONS, PT EVAL
bed mobility training/gait training/strengthening/transfer training/To negotiate one flight of stairs w/one handrail step over step 1 weeks

## 2020-04-13 NOTE — PROGRESS NOTE ADULT - PROBLEM SELECTOR PLAN 3
- Fingersticks elevated to 300s, Hgb A1c 11.9% indication of poorly controlled diabetes   - Endo consulted increased to 21 units lantus and 9 Units premeal, appreciate recs   - will continue to monitor and adjust as necessary - Fingersticks elevated to 300s, Hgb A1c 11.9% indication of poorly controlled diabetes   - Endo consulted increased to 18 units lantus and 11 Units premeal, appreciate recs   - will continue to monitor and adjust as necessary  - Upon discharge, will adjust regimen to lantus 16 and metformin 1000 BID

## 2020-04-13 NOTE — PROGRESS NOTE ADULT - SUBJECTIVE AND OBJECTIVE BOX
Patient is a 64y old  Male who presents with a chief complaint of SOB (12 Apr 2020 11:04)      SUBJECTIVE / OVERNIGHT EVENTS:    MEDICATIONS  (STANDING):  anakinra Injectable 100 milliGRAM(s) SubCutaneous daily  ascorbic acid 1500 milliGRAM(s) Oral daily  bisacodyl 5 milliGRAM(s) Oral every 12 hours  dextrose 5%. 1000 milliLiter(s) (50 mL/Hr) IV Continuous <Continuous>  dextrose 50% Injectable 12.5 Gram(s) IV Push once  dextrose 50% Injectable 25 Gram(s) IV Push once  dextrose 50% Injectable 25 Gram(s) IV Push once  enoxaparin Injectable 70 milliGRAM(s) SubCutaneous every 12 hours  influenza   Vaccine 0.5 milliLiter(s) IntraMuscular once  insulin glargine Injectable (LANTUS) 21 Unit(s) SubCutaneous at bedtime  insulin lispro (HumaLOG) corrective regimen sliding scale   SubCutaneous three times a day before meals  insulin lispro (HumaLOG) corrective regimen sliding scale   SubCutaneous at bedtime  insulin lispro Injectable (HumaLOG) 9 Unit(s) SubCutaneous three times a day before meals  polyethylene glycol 3350 17 Gram(s) Oral two times a day  senna 2 Tablet(s) Oral at bedtime  zinc sulfate 220 milliGRAM(s) Oral daily    MEDICATIONS  (PRN):  acetaminophen   Tablet .. 650 milliGRAM(s) Oral every 4 hours PRN Temp greater or equal to 38.5C (101.3F)  dextrose 40% Gel 15 Gram(s) Oral once PRN Blood Glucose LESS THAN 70 milliGRAM(s)/deciliter  glucagon  Injectable 1 milliGRAM(s) IntraMuscular once PRN Glucose LESS THAN 70 milligrams/deciliter  guaifenesin/dextromethorphan  Syrup 10 milliLiter(s) Oral every 6 hours PRN Cough      Vital Signs Last 24 Hrs  T(C): 36.7 (13 Apr 2020 04:11), Max: 36.8 (12 Apr 2020 21:47)  T(F): 98.1 (13 Apr 2020 04:11), Max: 98.2 (12 Apr 2020 21:47)  HR: 81 (13 Apr 2020 04:11) (81 - 94)  BP: 102/69 (13 Apr 2020 04:11) (85/59 - 103/68)  BP(mean): --  RR: 20 (13 Apr 2020 04:11) (20 - 20)  SpO2: 94% (13 Apr 2020 04:11) (94% - 96%)  CAPILLARY BLOOD GLUCOSE      POCT Blood Glucose.: 217 mg/dL (12 Apr 2020 20:58)  POCT Blood Glucose.: 239 mg/dL (12 Apr 2020 17:34)  POCT Blood Glucose.: 189 mg/dL (12 Apr 2020 11:33)    I&O's Summary    11 Apr 2020 07:01  -  12 Apr 2020 07:00  --------------------------------------------------------  IN: 0 mL / OUT: 250 mL / NET: -250 mL    12 Apr 2020 07:01  -  13 Apr 2020 06:49  --------------------------------------------------------  IN: 360 mL / OUT: 1600 mL / NET: -1240 mL        PHYSICAL EXAM:  GENERAL: NAD, well-developed  HEAD:  Atraumatic, Normocephalic  EYES: EOMI, PERRLA, conjunctiva and sclera clear  NECK: Supple, No JVD  CHEST/LUNG: Clear to auscultation bilaterally; No wheezing  HEART: Regular rate and rhythm; No murmurs, rubs, or gallops  ABDOMEN: Soft, Nontender, Nondistended; Bowel sounds present  EXTREMITIES:  2+ Peripheral Pulses, No clubbing, cyanosis, or edema  PSYCH: not agitated  NEUROLOGY: non-focal  SKIN: No rashes or lesions    LABS:                        10.7   5.76  )-----------( 605      ( 12 Apr 2020 06:33 )             34.5     04-12    137  |  102  |  12  ----------------------------<  117<H>  4.5   |  24  |  0.59    Ca    8.5      12 Apr 2020 06:33  Phos  4.0     04-12  Mg     2.1     04-12    TPro  6.3  /  Alb  2.7<L>  /  TBili  0.4  /  DBili  x   /  AST  102<H>  /  ALT  130<H>  /  AlkPhos  135<H>  04-12    RADIOLOGY & ADDITIONAL TESTS:    Imaging Personally Reviewed:    Consultant(s) Notes Reviewed:      Care Discussed with Consultants/Other Providers: Patient is a 64y old  Male who presents with a chief complaint of SOB (12 Apr 2020 11:04)      SUBJECTIVE / OVERNIGHT EVENTS:    Pt reports no acute events last night. No reports of fevers, chills, chest p/p, sob, n/v/d.     MEDICATIONS  (STANDING):  anakinra Injectable 100 milliGRAM(s) SubCutaneous daily  ascorbic acid 1500 milliGRAM(s) Oral daily  bisacodyl 5 milliGRAM(s) Oral every 12 hours  dextrose 5%. 1000 milliLiter(s) (50 mL/Hr) IV Continuous <Continuous>  dextrose 50% Injectable 12.5 Gram(s) IV Push once  dextrose 50% Injectable 25 Gram(s) IV Push once  dextrose 50% Injectable 25 Gram(s) IV Push once  enoxaparin Injectable 70 milliGRAM(s) SubCutaneous every 12 hours  influenza   Vaccine 0.5 milliLiter(s) IntraMuscular once  insulin glargine Injectable (LANTUS) 21 Unit(s) SubCutaneous at bedtime  insulin lispro (HumaLOG) corrective regimen sliding scale   SubCutaneous three times a day before meals  insulin lispro (HumaLOG) corrective regimen sliding scale   SubCutaneous at bedtime  insulin lispro Injectable (HumaLOG) 9 Unit(s) SubCutaneous three times a day before meals  polyethylene glycol 3350 17 Gram(s) Oral two times a day  senna 2 Tablet(s) Oral at bedtime  zinc sulfate 220 milliGRAM(s) Oral daily    MEDICATIONS  (PRN):  acetaminophen   Tablet .. 650 milliGRAM(s) Oral every 4 hours PRN Temp greater or equal to 38.5C (101.3F)  dextrose 40% Gel 15 Gram(s) Oral once PRN Blood Glucose LESS THAN 70 milliGRAM(s)/deciliter  glucagon  Injectable 1 milliGRAM(s) IntraMuscular once PRN Glucose LESS THAN 70 milligrams/deciliter  guaifenesin/dextromethorphan  Syrup 10 milliLiter(s) Oral every 6 hours PRN Cough      Vital Signs Last 24 Hrs  T(C): 36.7 (13 Apr 2020 04:11), Max: 36.8 (12 Apr 2020 21:47)  T(F): 98.1 (13 Apr 2020 04:11), Max: 98.2 (12 Apr 2020 21:47)  HR: 81 (13 Apr 2020 04:11) (81 - 94)  BP: 102/69 (13 Apr 2020 04:11) (85/59 - 103/68)  BP(mean): --  RR: 20 (13 Apr 2020 04:11) (20 - 20)  SpO2: 94% (13 Apr 2020 04:11) (94% - 96%)  CAPILLARY BLOOD GLUCOSE      POCT Blood Glucose.: 217 mg/dL (12 Apr 2020 20:58)  POCT Blood Glucose.: 239 mg/dL (12 Apr 2020 17:34)  POCT Blood Glucose.: 189 mg/dL (12 Apr 2020 11:33)    I&O's Summary    11 Apr 2020 07:01  -  12 Apr 2020 07:00  --------------------------------------------------------  IN: 0 mL / OUT: 250 mL / NET: -250 mL    12 Apr 2020 07:01  -  13 Apr 2020 06:49  --------------------------------------------------------  IN: 360 mL / OUT: 1600 mL / NET: -1240 mL        PHYSICAL EXAM:  GENERAL: NAD, well-developed  HEAD:  Atraumatic, Normocephalic  EYES: EOMI, PERRLA, conjunctiva and sclera clear  NECK: Supple, No JVD  CHEST/LUNG: Clear to auscultation bilaterally; No wheezing  HEART: Regular rate and rhythm; No murmurs, rubs, or gallops  ABDOMEN: Soft, Nontender, Nondistended; Bowel sounds present  EXTREMITIES:  2+ Peripheral Pulses, No clubbing, cyanosis, or edema  PSYCH: not agitated  NEUROLOGY: non-focal  SKIN: No rashes or lesions    LABS:                        10.7   5.76  )-----------( 605      ( 12 Apr 2020 06:33 )             34.5     04-12    137  |  102  |  12  ----------------------------<  117<H>  4.5   |  24  |  0.59    Ca    8.5      12 Apr 2020 06:33  Phos  4.0     04-12  Mg     2.1     04-12    TPro  6.3  /  Alb  2.7<L>  /  TBili  0.4  /  DBili  x   /  AST  102<H>  /  ALT  130<H>  /  AlkPhos  135<H>  04-12    RADIOLOGY & ADDITIONAL TESTS:    Imaging Personally Reviewed:    Consultant(s) Notes Reviewed:      Care Discussed with Consultants/Other Providers:

## 2020-04-14 VITALS
RESPIRATION RATE: 18 BRPM | DIASTOLIC BLOOD PRESSURE: 66 MMHG | OXYGEN SATURATION: 94 % | TEMPERATURE: 98 F | SYSTOLIC BLOOD PRESSURE: 101 MMHG | HEART RATE: 86 BPM

## 2020-04-14 LAB
GLUCOSE BLDC GLUCOMTR-MCNC: 141 MG/DL — HIGH (ref 70–99)
GLUCOSE BLDC GLUCOMTR-MCNC: 244 MG/DL — HIGH (ref 70–99)

## 2020-04-14 PROCEDURE — 85730 THROMBOPLASTIN TIME PARTIAL: CPT

## 2020-04-14 PROCEDURE — 70450 CT HEAD/BRAIN W/O DYE: CPT

## 2020-04-14 PROCEDURE — 83540 ASSAY OF IRON: CPT

## 2020-04-14 PROCEDURE — 85385 FIBRINOGEN ANTIGEN: CPT

## 2020-04-14 PROCEDURE — 84132 ASSAY OF SERUM POTASSIUM: CPT

## 2020-04-14 PROCEDURE — 85045 AUTOMATED RETICULOCYTE COUNT: CPT

## 2020-04-14 PROCEDURE — 85610 PROTHROMBIN TIME: CPT

## 2020-04-14 PROCEDURE — 85027 COMPLETE CBC AUTOMATED: CPT

## 2020-04-14 PROCEDURE — 97116 GAIT TRAINING THERAPY: CPT

## 2020-04-14 PROCEDURE — 83615 LACTATE (LD) (LDH) ENZYME: CPT

## 2020-04-14 PROCEDURE — 97161 PT EVAL LOW COMPLEX 20 MIN: CPT

## 2020-04-14 PROCEDURE — 71045 X-RAY EXAM CHEST 1 VIEW: CPT

## 2020-04-14 PROCEDURE — 84145 PROCALCITONIN (PCT): CPT

## 2020-04-14 PROCEDURE — 85652 RBC SED RATE AUTOMATED: CPT

## 2020-04-14 PROCEDURE — 86140 C-REACTIVE PROTEIN: CPT

## 2020-04-14 PROCEDURE — 82803 BLOOD GASES ANY COMBINATION: CPT

## 2020-04-14 PROCEDURE — 83550 IRON BINDING TEST: CPT

## 2020-04-14 PROCEDURE — 99285 EMERGENCY DEPT VISIT HI MDM: CPT

## 2020-04-14 PROCEDURE — 87635 SARS-COV-2 COVID-19 AMP PRB: CPT

## 2020-04-14 PROCEDURE — 86803 HEPATITIS C AB TEST: CPT

## 2020-04-14 PROCEDURE — 82962 GLUCOSE BLOOD TEST: CPT

## 2020-04-14 PROCEDURE — 85379 FIBRIN DEGRADATION QUANT: CPT

## 2020-04-14 PROCEDURE — 83036 HEMOGLOBIN GLYCOSYLATED A1C: CPT

## 2020-04-14 PROCEDURE — 82728 ASSAY OF FERRITIN: CPT

## 2020-04-14 PROCEDURE — 82330 ASSAY OF CALCIUM: CPT

## 2020-04-14 PROCEDURE — 84100 ASSAY OF PHOSPHORUS: CPT

## 2020-04-14 PROCEDURE — 83605 ASSAY OF LACTIC ACID: CPT

## 2020-04-14 PROCEDURE — 82435 ASSAY OF BLOOD CHLORIDE: CPT

## 2020-04-14 PROCEDURE — 84295 ASSAY OF SERUM SODIUM: CPT

## 2020-04-14 PROCEDURE — 85014 HEMATOCRIT: CPT

## 2020-04-14 PROCEDURE — 93005 ELECTROCARDIOGRAM TRACING: CPT

## 2020-04-14 PROCEDURE — 80053 COMPREHEN METABOLIC PANEL: CPT

## 2020-04-14 PROCEDURE — 82565 ASSAY OF CREATININE: CPT

## 2020-04-14 PROCEDURE — 82550 ASSAY OF CK (CPK): CPT

## 2020-04-14 PROCEDURE — 97110 THERAPEUTIC EXERCISES: CPT

## 2020-04-14 PROCEDURE — 83735 ASSAY OF MAGNESIUM: CPT

## 2020-04-14 PROCEDURE — 82947 ASSAY GLUCOSE BLOOD QUANT: CPT

## 2020-04-14 RX ORDER — ISOPROPYL ALCOHOL, BENZOCAINE .7; .06 ML/ML; ML/ML
1 SWAB TOPICAL
Qty: 100 | Refills: 1
Start: 2020-04-14 | End: 2020-06-02

## 2020-04-14 RX ORDER — GUAIFENESIN/DEXTROMETHORPHAN 600MG-30MG
10 TABLET, EXTENDED RELEASE 12 HR ORAL
Qty: 1 | Refills: 0
Start: 2020-04-14 | End: 2020-05-13

## 2020-04-14 RX ORDER — METFORMIN HYDROCHLORIDE 850 MG/1
1 TABLET ORAL
Qty: 60 | Refills: 0
Start: 2020-04-14 | End: 2020-05-13

## 2020-04-14 RX ORDER — METFORMIN HYDROCHLORIDE 850 MG/1
0 TABLET ORAL
Qty: 0 | Refills: 0 | DISCHARGE

## 2020-04-14 RX ORDER — INSULIN GLARGINE 100 [IU]/ML
16 INJECTION, SOLUTION SUBCUTANEOUS
Qty: 1 | Refills: 0
Start: 2020-04-14 | End: 2020-05-13

## 2020-04-14 RX ADMIN — SENNA PLUS 2 TABLET(S): 8.6 TABLET ORAL at 00:03

## 2020-04-14 RX ADMIN — Medication 100 MILLIGRAM(S): at 13:36

## 2020-04-14 RX ADMIN — Medication 5 MILLIGRAM(S): at 05:46

## 2020-04-14 RX ADMIN — Medication 4: at 09:00

## 2020-04-14 RX ADMIN — Medication 11 UNIT(S): at 09:00

## 2020-04-14 RX ADMIN — ENOXAPARIN SODIUM 70 MILLIGRAM(S): 100 INJECTION SUBCUTANEOUS at 05:39

## 2020-04-14 RX ADMIN — Medication 10 MILLILITER(S): at 10:01

## 2020-04-14 RX ADMIN — ZINC SULFATE TAB 220 MG (50 MG ZINC EQUIVALENT) 220 MILLIGRAM(S): 220 (50 ZN) TAB at 13:37

## 2020-04-14 RX ADMIN — Medication 11 UNIT(S): at 12:15

## 2020-04-14 RX ADMIN — Medication 1500 MILLIGRAM(S): at 13:37

## 2020-04-14 RX ADMIN — POLYETHYLENE GLYCOL 3350 17 GRAM(S): 17 POWDER, FOR SOLUTION ORAL at 05:47

## 2020-04-14 NOTE — PROGRESS NOTE ADULT - SUBJECTIVE AND OBJECTIVE BOX
Patient is a 64y old  Male who presents with a chief complaint of SOB (13 Apr 2020 12:55)      SUBJECTIVE / OVERNIGHT EVENTS:    MEDICATIONS  (STANDING):  anakinra Injectable 100 milliGRAM(s) SubCutaneous daily  ascorbic acid 1500 milliGRAM(s) Oral daily  bisacodyl 5 milliGRAM(s) Oral every 12 hours  dextrose 5%. 1000 milliLiter(s) (50 mL/Hr) IV Continuous <Continuous>  dextrose 50% Injectable 12.5 Gram(s) IV Push once  dextrose 50% Injectable 25 Gram(s) IV Push once  dextrose 50% Injectable 25 Gram(s) IV Push once  enoxaparin Injectable 70 milliGRAM(s) SubCutaneous every 12 hours  influenza   Vaccine 0.5 milliLiter(s) IntraMuscular once  insulin glargine Injectable (LANTUS) 18 Unit(s) SubCutaneous at bedtime  insulin lispro (HumaLOG) corrective regimen sliding scale   SubCutaneous three times a day before meals  insulin lispro (HumaLOG) corrective regimen sliding scale   SubCutaneous at bedtime  insulin lispro Injectable (HumaLOG) 11 Unit(s) SubCutaneous three times a day before meals  polyethylene glycol 3350 17 Gram(s) Oral two times a day  senna 2 Tablet(s) Oral at bedtime  zinc sulfate 220 milliGRAM(s) Oral daily    MEDICATIONS  (PRN):  acetaminophen   Tablet .. 650 milliGRAM(s) Oral every 4 hours PRN Temp greater or equal to 38.5C (101.3F)  dextrose 40% Gel 15 Gram(s) Oral once PRN Blood Glucose LESS THAN 70 milliGRAM(s)/deciliter  glucagon  Injectable 1 milliGRAM(s) IntraMuscular once PRN Glucose LESS THAN 70 milligrams/deciliter  guaifenesin/dextromethorphan  Syrup 10 milliLiter(s) Oral every 6 hours PRN Cough      Vital Signs Last 24 Hrs  T(C): 36.9 (14 Apr 2020 05:17), Max: 36.9 (14 Apr 2020 05:17)  T(F): 98.4 (14 Apr 2020 05:17), Max: 98.4 (14 Apr 2020 05:17)  HR: 90 (14 Apr 2020 05:17) (87 - 90)  BP: 90/58 (14 Apr 2020 05:17) (79/60 - 102/72)  BP(mean): --  RR: 20 (14 Apr 2020 05:17) (18 - 22)  SpO2: 94% (14 Apr 2020 05:17) (94% - 98%)  CAPILLARY BLOOD GLUCOSE      POCT Blood Glucose.: 178 mg/dL (13 Apr 2020 22:05)  POCT Blood Glucose.: 101 mg/dL (13 Apr 2020 17:25)  POCT Blood Glucose.: 98 mg/dL (13 Apr 2020 12:01)    I&O's Summary    12 Apr 2020 07:01  -  13 Apr 2020 07:00  --------------------------------------------------------  IN: 360 mL / OUT: 1600 mL / NET: -1240 mL    13 Apr 2020 07:01  -  14 Apr 2020 06:57  --------------------------------------------------------  IN: 1440 mL / OUT: 1300 mL / NET: 140 mL      PHYSICAL EXAM:  GENERAL: NAD, well-developed  HEAD:  Atraumatic, Normocephalic  EYES: EOMI, PERRLA, conjunctiva and sclera clear  NECK: Supple, No JVD  CHEST/LUNG: Clear to auscultation bilaterally; No wheezing  HEART: Regular rate and rhythm; No murmurs, rubs, or gallops  ABDOMEN: Soft, Nontender, Nondistended; Bowel sounds present  EXTREMITIES:  2+ Peripheral Pulses, No clubbing, cyanosis, or edema  PSYCH: not agitated  NEUROLOGY: non-focal  SKIN: No rashes or lesions    LABS:                        11.0   6.29  )-----------( 660      ( 13 Apr 2020 07:17 )             34.9     04-13    134<L>  |  98  |  15  ----------------------------<  219<H>  4.9   |  25  |  0.60    Ca    8.9      13 Apr 2020 07:15  Phos  3.7     04-13  Mg     2.0     04-13    TPro  6.7  /  Alb  3.0<L>  /  TBili  0.5  /  DBili  x   /  AST  78<H>  /  ALT  130<H>  /  AlkPhos  130<H>  04-13    RADIOLOGY & ADDITIONAL TESTS:    Imaging Personally Reviewed:    Consultant(s) Notes Reviewed:      Care Discussed with Consultants/Other Providers: Patient is a 64y old  Male who presents with a chief complaint of SOB (13 Apr 2020 12:55)      SUBJECTIVE / OVERNIGHT EVENTS:    Pt comfortable this morning. Reports some intermittent episodes of difficulty breathing. Denies fevers, chills, chest p/p, sob, n/v/d.      MEDICATIONS  (STANDING):  anakinra Injectable 100 milliGRAM(s) SubCutaneous daily  ascorbic acid 1500 milliGRAM(s) Oral daily  bisacodyl 5 milliGRAM(s) Oral every 12 hours  dextrose 5%. 1000 milliLiter(s) (50 mL/Hr) IV Continuous <Continuous>  dextrose 50% Injectable 12.5 Gram(s) IV Push once  dextrose 50% Injectable 25 Gram(s) IV Push once  dextrose 50% Injectable 25 Gram(s) IV Push once  enoxaparin Injectable 70 milliGRAM(s) SubCutaneous every 12 hours  influenza   Vaccine 0.5 milliLiter(s) IntraMuscular once  insulin glargine Injectable (LANTUS) 18 Unit(s) SubCutaneous at bedtime  insulin lispro (HumaLOG) corrective regimen sliding scale   SubCutaneous three times a day before meals  insulin lispro (HumaLOG) corrective regimen sliding scale   SubCutaneous at bedtime  insulin lispro Injectable (HumaLOG) 11 Unit(s) SubCutaneous three times a day before meals  polyethylene glycol 3350 17 Gram(s) Oral two times a day  senna 2 Tablet(s) Oral at bedtime  zinc sulfate 220 milliGRAM(s) Oral daily    MEDICATIONS  (PRN):  acetaminophen   Tablet .. 650 milliGRAM(s) Oral every 4 hours PRN Temp greater or equal to 38.5C (101.3F)  dextrose 40% Gel 15 Gram(s) Oral once PRN Blood Glucose LESS THAN 70 milliGRAM(s)/deciliter  glucagon  Injectable 1 milliGRAM(s) IntraMuscular once PRN Glucose LESS THAN 70 milligrams/deciliter  guaifenesin/dextromethorphan  Syrup 10 milliLiter(s) Oral every 6 hours PRN Cough      Vital Signs Last 24 Hrs  T(C): 36.9 (14 Apr 2020 05:17), Max: 36.9 (14 Apr 2020 05:17)  T(F): 98.4 (14 Apr 2020 05:17), Max: 98.4 (14 Apr 2020 05:17)  HR: 90 (14 Apr 2020 05:17) (87 - 90)  BP: 90/58 (14 Apr 2020 05:17) (79/60 - 102/72)  BP(mean): --  RR: 20 (14 Apr 2020 05:17) (18 - 22)  SpO2: 94% (14 Apr 2020 05:17) (94% - 98%)  CAPILLARY BLOOD GLUCOSE      POCT Blood Glucose.: 178 mg/dL (13 Apr 2020 22:05)  POCT Blood Glucose.: 101 mg/dL (13 Apr 2020 17:25)  POCT Blood Glucose.: 98 mg/dL (13 Apr 2020 12:01)    I&O's Summary    12 Apr 2020 07:01  -  13 Apr 2020 07:00  --------------------------------------------------------  IN: 360 mL / OUT: 1600 mL / NET: -1240 mL    13 Apr 2020 07:01  -  14 Apr 2020 06:57  --------------------------------------------------------  IN: 1440 mL / OUT: 1300 mL / NET: 140 mL      PHYSICAL EXAM:  GENERAL: NAD, well-developed  HEAD:  Atraumatic, Normocephalic  EYES: EOMI, PERRLA, conjunctiva and sclera clear  NECK: Supple, No JVD  CHEST/LUNG: Clear to auscultation bilaterally; No wheezing  HEART: Regular rate and rhythm; No murmurs, rubs, or gallops  ABDOMEN: Soft, Nontender, Nondistended; Bowel sounds present  EXTREMITIES:  2+ Peripheral Pulses, No clubbing, cyanosis, or edema  PSYCH: not agitated  NEUROLOGY: non-focal  SKIN: No rashes or lesions    LABS:                        11.0   6.29  )-----------( 660      ( 13 Apr 2020 07:17 )             34.9     04-13    134<L>  |  98  |  15  ----------------------------<  219<H>  4.9   |  25  |  0.60    Ca    8.9      13 Apr 2020 07:15  Phos  3.7     04-13  Mg     2.0     04-13    TPro  6.7  /  Alb  3.0<L>  /  TBili  0.5  /  DBili  x   /  AST  78<H>  /  ALT  130<H>  /  AlkPhos  130<H>  04-13    RADIOLOGY & ADDITIONAL TESTS:    Imaging Personally Reviewed:    Consultant(s) Notes Reviewed:      Care Discussed with Consultants/Other Providers:

## 2020-04-14 NOTE — PROGRESS NOTE ADULT - PROBLEM SELECTOR PLAN 1
- COVID POSITIVE  - off plquenil due to elevated QTc   - c/w anakinra, now qd dosing to be finished on 4/15.  - CRP  downtrending   - Encourage patient to prone and lay on side, OOBTC with assistance, incentive spirometry

## 2020-04-14 NOTE — PROGRESS NOTE ADULT - PROBLEM SELECTOR PLAN 2
- Viral PNA 2/2 COVID Positive   - CXR with Bilateral Patchy Opacities   - Repeat CXR showed some sign of edema within the right fissure, s/p 10 mg lasix IVP x1  - Encourage to proning, OOBTC with assistance.  - saturating  94%-96 % on 2L NC, will continue to wean and assess status with ambulation - Viral PNA 2/2 COVID Positive   - CXR with Bilateral Patchy Opacities   - Repeat CXR showed some sign of edema within the right fissure, s/p 10 mg lasix IVP x1  - Encourage to proning, OOBTC with assistance.  - saturating  well on RA

## 2020-04-14 NOTE — DISCHARGE NOTE NURSING/CASE MANAGEMENT/SOCIAL WORK - PATIENT PORTAL LINK FT
You can access the FollowMyHealth Patient Portal offered by [facility] by registering at the following website: [email or web address]. By joining [facility]’s FollowMyHealth portal, you will also be able to view your health information using other applications (apps) compatible with our system.

## 2020-04-14 NOTE — PROGRESS NOTE ADULT - PROBLEM SELECTOR PLAN 4
Diet: carb consistent  DVT ppx Lovenox Diet: carb consistent  DVT ppx Lovenox, pt will not require anticoagulation upon discharge as per current guidelines, IMPROVE score only 1

## 2020-04-14 NOTE — PROGRESS NOTE ADULT - PROBLEM SELECTOR PLAN 3
- Fingersticks elevated to 300s, Hgb A1c 11.9% indication of poorly controlled diabetes   - Endo consulted increased to 18 units lantus and 11 Units premeal, appreciate recs   - will continue to monitor and adjust as necessary  - Upon discharge, will adjust regimen to lantus 16 and metformin 1000 BID

## 2020-04-14 NOTE — PROGRESS NOTE ADULT - ATTENDING COMMENTS
63 y/o male with hx of DM II, presented to the ED with complaints of cough, myalgias, fever and progressively worsening sob for the past several days, noted to have acute respiratory failure with hypoxia likely due to viral Pneumonia 2/2 to COVID, No acute events overnight. Patient stable to be discharge Sat 94-98% RA  Plan:  Start Metformin 1000mg BID and Lantus 16 units qhs as per endo/ supplies  Patient will not require anticoagulation upon discharge as per current guidelines, IMPROVE score only 1

## 2021-06-14 NOTE — ED PROVIDER NOTE - DISPOSITION TYPE
Pt states he had sutures placed in right 2nd finger 2 days ago and was told to return for a wound recheck. ADMIT

## 2021-07-26 NOTE — DISCHARGE NOTE PROVIDER - NSDCQMCOGNITION_NEU_ALL_CORE
No difficulties O-Z Plasty Text: The defect edges were debeveled with a #15 scalpel blade.  Given the location of the defect, shape of the defect and the proximity to free margins an O-Z plasty (double transposition flap) was deemed most appropriate.  Using a sterile surgical marker, the appropriate transposition flaps were drawn incorporating the defect and placing the expected incisions within the relaxed skin tension lines where possible.    The area thus outlined was incised deep to adipose tissue with a #15 scalpel blade.  The skin margins were undermined to an appropriate distance in all directions utilizing iris scissors.  Hemostasis was achieved with electrocautery.  The flaps were then transposed into place, one clockwise and the other counterclockwise, and anchored with interrupted buried subcutaneous sutures.

## 2021-10-02 NOTE — DISCHARGE NOTE PROVIDER - HOSPITAL COURSE
65yo male with hx of DM II, presented to the ED with complaints of cough, myalgias, fever and progressively worsening sob for the past several days. Pt states his symptoms started approximately 4-5 days ago. He initially complained of a mild cough which worsened with sob. He started developing fever as well. 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 He states he stopped going to work 1 week ago, a few days before developing symptoms. No travel history either.    Pt denies symptoms of cp, palpitations, abd pain, N/V. He currently has complaints of sob with movement and diffuse body aches.         In the ED pt noted to have respiratory compromise, requiring supplemental O2 with NC. 65yo male with hx of DM II, presented to the ED with complaints of cough, myalgias, fever and progressively worsening sob and fevers for 4-5 days. At the ED, pt vitals sig for BP 80/54, HR 91, RR 22, Temp 100.2, O2 sat is 89% on RA. Initial CBC sig for anemia with Hg to 11. CMP wnl. Initial CRP elevated to 24. CXR showing bilateral patchy opacities. Pt admitted for acute hypoxic respiratory 2/2 COVID19 infection. On admission, pt placed on NRB w/ improvement in oxygenation. Pt started on hydroxychloroquine bu pt oxygenation w/o significant improvement. Hydroxychloroquine dc'ed, switched to anakinra. Pt oxygenation improved, able to transition to NC, and eventually RA. Pt otherwise hemodynamically stable and medically optimized for discharge. No
